# Patient Record
Sex: FEMALE | Race: WHITE | NOT HISPANIC OR LATINO | Employment: OTHER | URBAN - METROPOLITAN AREA
[De-identification: names, ages, dates, MRNs, and addresses within clinical notes are randomized per-mention and may not be internally consistent; named-entity substitution may affect disease eponyms.]

---

## 2020-06-04 ENCOUNTER — TRANSCRIBE ORDERS (OUTPATIENT)
Dept: ADMINISTRATIVE | Facility: HOSPITAL | Age: 75
End: 2020-06-04

## 2020-06-04 DIAGNOSIS — Z12.31 ENCOUNTER FOR SCREENING MAMMOGRAM FOR MALIGNANT NEOPLASM OF BREAST: Primary | ICD-10-CM

## 2021-04-07 RX ORDER — AMITRIPTYLINE HYDROCHLORIDE 10 MG/1
TABLET, FILM COATED ORAL
Qty: 90 TABLET | Refills: 1 | OUTPATIENT
Start: 2021-04-07

## 2021-05-11 ENCOUNTER — HOSPITAL ENCOUNTER (EMERGENCY)
Facility: HOSPITAL | Age: 76
Discharge: HOME | End: 2021-05-11
Attending: EMERGENCY MEDICINE | Admitting: EMERGENCY MEDICINE

## 2021-05-11 VITALS
RESPIRATION RATE: 18 BRPM | OXYGEN SATURATION: 97 % | WEIGHT: 145 LBS | DIASTOLIC BLOOD PRESSURE: 84 MMHG | TEMPERATURE: 98.5 F | HEART RATE: 73 BPM | BODY MASS INDEX: 24.16 KG/M2 | HEIGHT: 65 IN | SYSTOLIC BLOOD PRESSURE: 184 MMHG

## 2021-05-11 DIAGNOSIS — R52 GENERALIZED PAIN: ICD-10-CM

## 2021-05-11 DIAGNOSIS — L40.9 PSORIASIS: ICD-10-CM

## 2021-05-11 DIAGNOSIS — E11.49 OTHER DIABETIC NEUROLOGICAL COMPLICATION ASSOCIATED WITH TYPE 2 DIABETES MELLITUS (HCC): Primary | ICD-10-CM

## 2021-05-11 PROCEDURE — 99283 EMERGENCY DEPT VISIT LOW MDM: CPT

## 2021-05-11 RX ORDER — PANTOPRAZOLE SODIUM 40 MG/1
TABLET, DELAYED RELEASE ORAL
COMMUNITY
End: 2021-05-25 | Stop reason: SDUPTHER

## 2021-05-11 RX ORDER — GABAPENTIN 600 MG/1
600 TABLET ORAL 4 TIMES DAILY
COMMUNITY
Start: 2021-02-27

## 2021-05-11 RX ORDER — DIAZEPAM 5 MG/1
5 TABLET ORAL
COMMUNITY
Start: 2021-02-25 | End: 2021-05-24

## 2021-05-11 RX ORDER — AMITRIPTYLINE HYDROCHLORIDE 10 MG/1
TABLET, FILM COATED ORAL
COMMUNITY
End: 2021-05-24

## 2021-05-11 RX ORDER — CITALOPRAM 20 MG/1
TABLET ORAL
COMMUNITY
End: 2021-05-24

## 2021-05-11 RX ORDER — LITHIUM CARBONATE 150 MG/1
CAPSULE ORAL
COMMUNITY
End: 2022-05-05 | Stop reason: SDUPTHER

## 2021-05-11 RX ORDER — GABAPENTIN 100 MG/1
400 CAPSULE ORAL 3 TIMES DAILY
Status: DISCONTINUED | OUTPATIENT
Start: 2021-05-11 | End: 2021-05-12 | Stop reason: HOSPADM

## 2021-05-11 RX ORDER — GABAPENTIN 300 MG/1
300 CAPSULE ORAL 3 TIMES DAILY
Qty: 12 CAPSULE | Refills: 0 | Status: SHIPPED | OUTPATIENT
Start: 2021-05-11 | End: 2021-05-24 | Stop reason: DRUGHIGH

## 2021-05-11 RX ADMIN — GABAPENTIN 400 MG: 100 CAPSULE ORAL at 21:59

## 2021-05-24 ENCOUNTER — OFFICE VISIT (OUTPATIENT)
Dept: INTERNAL MEDICINE | Facility: CLINIC | Age: 76
End: 2021-05-24

## 2021-05-24 VITALS
TEMPERATURE: 98.4 F | DIASTOLIC BLOOD PRESSURE: 86 MMHG | BODY MASS INDEX: 26.66 KG/M2 | HEIGHT: 65 IN | SYSTOLIC BLOOD PRESSURE: 128 MMHG | WEIGHT: 160 LBS | OXYGEN SATURATION: 98 % | HEART RATE: 65 BPM

## 2021-05-24 DIAGNOSIS — R73.03 PREDIABETES: ICD-10-CM

## 2021-05-24 DIAGNOSIS — Z79.899 LONG-TERM CURRENT USE OF LITHIUM: ICD-10-CM

## 2021-05-24 DIAGNOSIS — E78.2 MIXED HYPERLIPIDEMIA: ICD-10-CM

## 2021-05-24 DIAGNOSIS — R03.0 BORDERLINE HYPERTENSION: ICD-10-CM

## 2021-05-24 DIAGNOSIS — F17.210 CIGARETTE SMOKER: ICD-10-CM

## 2021-05-24 DIAGNOSIS — K21.9 GASTROESOPHAGEAL REFLUX DISEASE, UNSPECIFIED WHETHER ESOPHAGITIS PRESENT: ICD-10-CM

## 2021-05-24 DIAGNOSIS — F31.32 BIPOLAR AFFECTIVE DISORDER, CURRENTLY DEPRESSED, MODERATE (HCC): Primary | ICD-10-CM

## 2021-05-24 DIAGNOSIS — Z11.59 NEED FOR HEPATITIS C SCREENING TEST: ICD-10-CM

## 2021-05-24 PROBLEM — L40.9 PSORIASIS: Status: ACTIVE | Noted: 2020-09-23

## 2021-05-24 PROBLEM — F31.9 BIPOLAR DISORDER (HCC): Status: ACTIVE | Noted: 2020-06-01

## 2021-05-24 LAB — HBA1C MFR BLD: 5.7 %

## 2021-05-24 PROCEDURE — 83036 HEMOGLOBIN GLYCOSYLATED A1C: CPT | Performed by: STUDENT IN AN ORGANIZED HEALTH CARE EDUCATION/TRAINING PROGRAM

## 2021-05-24 PROCEDURE — 99205 OFFICE O/P NEW HI 60 MIN: CPT | Performed by: STUDENT IN AN ORGANIZED HEALTH CARE EDUCATION/TRAINING PROGRAM

## 2021-05-24 RX ORDER — QUETIAPINE FUMARATE 50 MG/1
TABLET, FILM COATED ORAL
Qty: 75 TABLET | Refills: 0 | Status: SHIPPED | OUTPATIENT
Start: 2021-05-24 | End: 2021-05-25 | Stop reason: ALTCHOICE

## 2021-05-24 NOTE — PROGRESS NOTES
Chief Complaint  Cierra Alvarez is a 75 y.o. female presenting for Elevated Blood Pressure (Establish care), Depression, and sleep issues.     From [facility]. Lives by herself, single, no children. Niece in [place] is closest family member - symptoms of Huntingtons.  Patient has 2 siblings with David's.  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 Lives by herself, single, no children. Niece in [place] is closest family member - symptoms of Huntingtons.  Patient has 2 siblings with Pittsford's.  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    Patient has a past medical history of bipolar disorder, depression, GERD, osteoarthritis, prediabetes (denies T2DM or A1c over 6.4), psoriasis, vitamin D deficiency and elevated blood pressures.    History of Present Illness  Patient is here to establish care.  He has a history of depression and in the past she has been on citalopram, Prozac, amitriptyline.  She did have manic episodes in the past.  She is no longer follow-up with psychiatry, but is looking into options.  Recently she has been feeling more down and depressed, decreased sleep.  She is requesting to restart amitriptyline, but it sounds like she also had reaction to this in the past, possibly low blood pressure.  She has never been on Seroquel.  She also used to be on Xanax and diazepam, but has not used for months and does not want to stay on this medication.  She uses lithium 150 mg 3 times daily.    She did not have hypertension in the past she says, however she has not told monitoring her blood pressures at home, and typically range in the 150s over 80s, however this been occasional blood pressure around 170 systolic and up to around 90 diastolic.  However she also says that sometimes it measures very low, and this morning it was around 90/60.    She also has a history of neuropathy, and follows with neurology.  She is on gabapentin prescribed by  "her neurologist.    There is outside records suggesting she has a history of T2DM with neuropathy.  Patient adamantly denies ever being diagnosed with T2DM, she says her A1c never was over 6.4.  She states she has a history of prediabetes.  With this information I have placed prediabetes as her diagnosis.    She also has a history of GERD, she uses Protonix.    Patient smokes cigarettes, now about 1 pack a day, not long ago she used to smoke 3 packs a day.  She is trying to cut down and would like to quit smoking, but she is not ready to quit entirely yet.    Patient is very physically active, walks a lot downtown where she lives.  Goes to the library regularly.    The following portions of the patient's history were reviewed and updated as appropriate: allergies, current medications, past family history, past medical history, past social history, past surgical history and problem list.    PHQ-9 Depression Screening  Little interest or pleasure in doing things? 3   Feeling down, depressed, or hopeless? 3   Trouble falling or staying asleep, or sleeping too much? 3   Feeling tired or having little energy? 0   Poor appetite or overeating? 3   Feeling bad about yourself - or that you are a failure or have let yourself or your family down? 0   Trouble concentrating on things, such as reading the newspaper or watching television? 3   Moving or speaking so slowly that other people could have noticed? Or the opposite - being so fidgety or restless that you have been moving around a lot more than usual? 1   Thoughts that you would be better off dead, or of hurting yourself in some way? 0   PHQ-9 Total Score 16   If you checked off any problems, how difficult have these problems made it for you to do your work, take care of things at home, or get along with other people? Extremely dIfficult         Objective  /86   Pulse 65   Temp 98.4 °F (36.9 °C) (Temporal)   Ht 165.1 cm (65\")   Wt 72.6 kg (160 lb)   SpO2 98%   " BMI 26.63 kg/m²     Physical Exam  Vitals reviewed.   Constitutional:       Appearance: Normal appearance.   HENT:      Head: Normocephalic and atraumatic.      Nose: Nose normal. No congestion.      Mouth/Throat:      Mouth: Mucous membranes are moist.   Eyes:      Extraocular Movements: Extraocular movements intact.      Conjunctiva/sclera: Conjunctivae normal.   Cardiovascular:      Rate and Rhythm: Normal rate and regular rhythm.      Heart sounds: Normal heart sounds. No murmur heard.     Pulmonary:      Effort: Pulmonary effort is normal.      Breath sounds: Normal breath sounds.   Abdominal:      General: There is no distension.      Palpations: Abdomen is soft. There is no mass.      Tenderness: There is no abdominal tenderness.   Musculoskeletal:      Cervical back: Neck supple.      Right lower leg: Edema present.      Left lower leg: Edema present.      Comments: Mild bilateral lower leg edema.   Skin:     General: Skin is warm and dry.   Neurological:      Mental Status: She is alert and oriented to person, place, and time. Mental status is at baseline.   Psychiatric:         Mood and Affect: Mood normal.         Behavior: Behavior normal.         Thought Content: Thought content normal.         Judgment: Judgment normal.         Assessment/Plan   1. Bipolar affective disorder, currently depressed, moderate (CMS/HCC)  2. Long-term current use of lithium  Patient currently with moderate depression.  No SI whatsoever, no HI.  Patient did not have any suicide attempts in the past.  Despite that patient has used SSRIs in the past, I have explained to patient my concern of starting her on SSRI with the potential risk of hypomania/su, which she has experienced in the past.  Also amitriptyline is usually not recommended in elderly due to anticholinergic side effects, risk for low blood pressure.  Patient has not tried quetiapine in the past and is willing to try it.  We will do the gradual increase and  patient will return for follow-up with me in 4 weeks.  We will do labs as ordered due to her lithium use.  Patient will continue on current dose.  - QUEtiapine (SEROquel) 50 MG tablet; Take 1 tablet by mouth Every Night for 5 days, THEN 2 tablets Every Night for 5 days, THEN 3 tablets Every Night for 20 days.  Dispense: 75 tablet; Refill: 0  - Comprehensive Metabolic Panel; Future  - TSH Rfx On Abnormal To Free T4; Future    3. Prediabetes  Hemoglobin A1C   Date Value Ref Range Status   05/24/2021 5.7 % Final   Patient meets criteria for prediabetes.  For now I am assuming her history is accurate, and would not place a diagnosis of diabetes for now.  - POC Glycosylated Hemoglobin (Hb A1C)    4. Gastroesophageal reflux disease, unspecified whether esophagitis present  Stable.  No current symptoms.  Patient wants to continue on her tonics.    5. Borderline hypertension  BP Readings from Last 3 Encounters:   05/24/21 128/86   05/11/21 (!) 184/84   Patient certainly has elevated blood pressures at home, however there is a wide range, and blood pressure initially today is mildly elevated, but repeat is normal.  I recommended she bring in her blood pressure monitor on her next visit to check it against our measurement.  If she continues to have elevated blood pressures I would recommend starting her on medications.    6. Mixed hyperlipidemia  We will check current levels.  Not on statin.  - Lipid Panel; Future    7. Cigarette smoker  Cierra Alvarez  reports that she has been smoking cigarettes. She has a 59.00 pack-year smoking history. She has never used smokeless tobacco.. I have educated her on the risk of diseases from using tobacco products such as cancer, COPD and heart disease.     I advised her to quit and she is not willing to quit at this time, but is precontemplative.    I spent 3  minutes counseling the patient.    8. Need for hepatitis C screening test  We will screen per recommendations.  - Hepatitis C  Antibody; Future    Total time spent on chart review, charting and face-to-face with patient 70 minutes.    Patient has been erroneously marked as diabetic. Based on the available clinical information, she does not have diabetes and should therefore be excluded from diabetic health maintenance and quality measures for the remainder of the reporting period.    I have recommended colonoscopy for patient, she will think about it until next visit.    Return in about 4 weeks (around 6/21/2021) for Medicare Wellness.    Future Appointments       Provider Department Center    6/21/2021 8:30 AM Pietro Barber MD [facility] INTERNAL MEDICINE [facility]            Pietro Barber MD  Family Medicine  05/24/2021    Note: Speech recognition transcription software may have been used to create portions of this document.  An attempt at proofreading has been made but errors in transcription could still be present.

## 2021-05-25 ENCOUNTER — TELEPHONE (OUTPATIENT)
Dept: INTERNAL MEDICINE | Facility: CLINIC | Age: 76
End: 2021-05-25

## 2021-05-25 DIAGNOSIS — F33.8 OTHER RECURRENT DEPRESSIVE DISORDERS (HCC): ICD-10-CM

## 2021-05-25 DIAGNOSIS — K21.9 GASTROESOPHAGEAL REFLUX DISEASE, UNSPECIFIED WHETHER ESOPHAGITIS PRESENT: Primary | ICD-10-CM

## 2021-05-25 RX ORDER — AMITRIPTYLINE HYDROCHLORIDE 10 MG/1
TABLET, FILM COATED ORAL
Qty: 90 TABLET | Refills: 1 | OUTPATIENT
Start: 2021-05-25

## 2021-05-25 RX ORDER — FLUOXETINE HYDROCHLORIDE 20 MG/1
20 CAPSULE ORAL DAILY
Qty: 30 CAPSULE | Refills: 1 | Status: SHIPPED | OUTPATIENT
Start: 2021-05-25 | End: 2021-06-16 | Stop reason: SDUPTHER

## 2021-05-25 RX ORDER — AMITRIPTYLINE HYDROCHLORIDE 10 MG/1
10 TABLET, FILM COATED ORAL NIGHTLY
Qty: 30 TABLET | Refills: 1 | Status: SHIPPED | OUTPATIENT
Start: 2021-05-25 | End: 2021-06-16 | Stop reason: SDUPTHER

## 2021-05-25 RX ORDER — PANTOPRAZOLE SODIUM 40 MG/1
40 TABLET, DELAYED RELEASE ORAL DAILY
Qty: 30 TABLET | Refills: 2 | Status: SHIPPED | OUTPATIENT
Start: 2021-05-25 | End: 2021-09-07

## 2021-05-25 NOTE — TELEPHONE ENCOUNTER
Caller: Cierra Alvarez    Relationship: Self    Best call back number: [phone]    What medication are you requesting: PROZAC 20MG AND ELAVIL 20MG    What are your current symptoms: BIPOLAR DEPRESSIVE EPISODE    How long have you been experiencing symptoms: 2 MONTHS    Have you had these symptoms before:    [x] Yes  [] No    Have you been treated for these symptoms before:   [x] Yes  [] No    If a prescription is needed, what is your preferred pharmacy and phone number: [facility] [id number] [place] [address] [phone] [facility] [phone]      Additional notes: THE PATIENT STATED SHE DOES NOT THINK SHE CAN TAKE QUEtiapine (SEROquel) 50 MG tablet DUE TO HER DIABETES. THE PATIENT WOULD LIKE PROZAC 20MG AND ELAVIL 20 MG FOR SLEEP INSTEAD TO HELP HER THROUGH THIS EPISODE. SHE WOULD A CALL BACK ASA.

## 2021-05-25 NOTE — TELEPHONE ENCOUNTER
See note from patient.  I called her and talk to her over the phone.  Patient as well as potential side effects Seroquel and with better go back to Prozac and Elavil that she used to be on.  I had a long discussion with her that these are not typically recommended in patients with bipolar disorder, take care of the risk of elevated mood, hypomania or su.  Also Elavil can cause cough for blood pressure and anticholinergic side effects.  Patient is still adamant that she wants to get back on these medications as she has tolerated it in the past.    We will discontinue Seroquel for now and start on these medications per patient preference, despite my advice.  She will follow up with me as planned.    1. Gastroesophageal reflux disease, unspecified whether esophagitis present  - pantoprazole (PROTONIX) 40 MG EC tablet; Take 1 tablet by mouth Daily.  Dispense: 30 tablet; Refill: 2    2. Other recurrent depressive disorders (CMS/HCC)  - FLUoxetine (PROzac) 20 MG capsule; Take 1 capsule by mouth Daily.  Dispense: 30 capsule; Refill: 1  - amitriptyline (ELAVIL) 10 MG tablet; Take 1 tablet by mouth Every Night.  Dispense: 30 tablet; Refill: 1    Future Appointments       Provider Department Center    6/21/2021 8:30 AM Pietro Barber MD [facility] INTERNAL MEDICINE [facility]        Pietro Barber MD

## 2021-06-01 ENCOUNTER — TELEPHONE (OUTPATIENT)
Dept: INTERNAL MEDICINE | Facility: CLINIC | Age: 76
End: 2021-06-01

## 2021-06-16 DIAGNOSIS — F33.8 OTHER RECURRENT DEPRESSIVE DISORDERS (HCC): ICD-10-CM

## 2021-06-16 RX ORDER — AMITRIPTYLINE HYDROCHLORIDE 10 MG/1
10 TABLET, FILM COATED ORAL NIGHTLY
Qty: 90 TABLET | Refills: 1 | Status: SHIPPED | OUTPATIENT
Start: 2021-06-16 | End: 2021-10-13 | Stop reason: SDUPTHER

## 2021-06-16 RX ORDER — FLUOXETINE HYDROCHLORIDE 20 MG/1
20 CAPSULE ORAL DAILY
Qty: 90 CAPSULE | Refills: 1 | Status: SHIPPED | OUTPATIENT
Start: 2021-06-16 | End: 2021-10-13 | Stop reason: SDUPTHER

## 2021-06-17 ENCOUNTER — LAB (OUTPATIENT)
Dept: LAB | Facility: HOSPITAL | Age: 76
End: 2021-06-17

## 2021-06-17 DIAGNOSIS — F31.32 BIPOLAR AFFECTIVE DISORDER, CURRENTLY DEPRESSED, MODERATE (HCC): ICD-10-CM

## 2021-06-17 DIAGNOSIS — E78.2 MIXED HYPERLIPIDEMIA: ICD-10-CM

## 2021-06-17 DIAGNOSIS — Z11.59 NEED FOR HEPATITIS C SCREENING TEST: ICD-10-CM

## 2021-06-17 LAB
ALBUMIN SERPL-MCNC: 4.1 G/DL (ref 3.5–5.2)
ALBUMIN/GLOB SERPL: 1.9 G/DL
ALP SERPL-CCNC: 96 U/L (ref 39–117)
ALT SERPL W P-5'-P-CCNC: 14 U/L (ref 1–33)
ANION GAP SERPL CALCULATED.3IONS-SCNC: 7.5 MMOL/L (ref 5–15)
AST SERPL-CCNC: 14 U/L (ref 1–32)
BILIRUB SERPL-MCNC: 0.2 MG/DL (ref 0–1.2)
BUN SERPL-MCNC: 10 MG/DL (ref 8–23)
BUN/CREAT SERPL: 14.9 (ref 7–25)
CALCIUM SPEC-SCNC: 9 MG/DL (ref 8.6–10.5)
CHLORIDE SERPL-SCNC: 104 MMOL/L (ref 98–107)
CHOLEST SERPL-MCNC: 166 MG/DL (ref 0–200)
CO2 SERPL-SCNC: 28.5 MMOL/L (ref 22–29)
CREAT SERPL-MCNC: 0.67 MG/DL (ref 0.57–1)
GFR SERPL CREATININE-BSD FRML MDRD: 86 ML/MIN/1.73
GLOBULIN UR ELPH-MCNC: 2.2 GM/DL
GLUCOSE SERPL-MCNC: 97 MG/DL (ref 65–99)
HCV AB SER DONR QL: NORMAL
HDLC SERPL-MCNC: 70 MG/DL (ref 40–60)
LDLC SERPL CALC-MCNC: 75 MG/DL (ref 0–100)
LDLC/HDLC SERPL: 1.03 {RATIO}
POTASSIUM SERPL-SCNC: 4.2 MMOL/L (ref 3.5–5.2)
PROT SERPL-MCNC: 6.3 G/DL (ref 6–8.5)
SODIUM SERPL-SCNC: 140 MMOL/L (ref 136–145)
TRIGL SERPL-MCNC: 118 MG/DL (ref 0–150)
TSH SERPL DL<=0.05 MIU/L-ACNC: 1.55 UIU/ML (ref 0.27–4.2)
VLDLC SERPL-MCNC: 21 MG/DL (ref 5–40)

## 2021-06-17 PROCEDURE — 86803 HEPATITIS C AB TEST: CPT

## 2021-06-17 PROCEDURE — 80061 LIPID PANEL: CPT

## 2021-06-17 PROCEDURE — 80053 COMPREHEN METABOLIC PANEL: CPT

## 2021-06-17 PROCEDURE — 84443 ASSAY THYROID STIM HORMONE: CPT

## 2021-06-18 ENCOUNTER — TELEPHONE (OUTPATIENT)
Dept: INTERNAL MEDICINE | Facility: CLINIC | Age: 76
End: 2021-06-18

## 2021-06-18 NOTE — TELEPHONE ENCOUNTER
----- Message from Pietro Barber MD sent at 6/18/2021  8:16 AM EDT -----  Blood tests came back showing normal thyroid, normal blood sugar, kidney, electrolytes and liver tests.  Cholesterol levels look good.There is no sign of liver infection with hepatitis C virus.

## 2021-06-18 NOTE — TELEPHONE ENCOUNTER
"Hub to read: \"Blood tests came back showing normal thyroid, normal blood sugar, kidney, electrolytes and liver tests.  Cholesterol levels look good.There is no sign of liver infection with hepatitis C virus.\"     "

## 2021-06-21 ENCOUNTER — OFFICE VISIT (OUTPATIENT)
Dept: INTERNAL MEDICINE | Facility: CLINIC | Age: 76
End: 2021-06-21

## 2021-06-21 VITALS
WEIGHT: 154 LBS | HEART RATE: 59 BPM | DIASTOLIC BLOOD PRESSURE: 84 MMHG | OXYGEN SATURATION: 97 % | BODY MASS INDEX: 25.66 KG/M2 | TEMPERATURE: 97.5 F | SYSTOLIC BLOOD PRESSURE: 136 MMHG | HEIGHT: 65 IN

## 2021-06-21 DIAGNOSIS — G95.0 SYRINGOMYELIA (HCC): ICD-10-CM

## 2021-06-21 DIAGNOSIS — Z91.81 AT MODERATE RISK FOR FALL: ICD-10-CM

## 2021-06-21 DIAGNOSIS — F31.75 BIPOLAR DISORDER, IN PARTIAL REMISSION, MOST RECENT EPISODE DEPRESSED (HCC): ICD-10-CM

## 2021-06-21 DIAGNOSIS — F17.210 CIGARETTE SMOKER: ICD-10-CM

## 2021-06-21 DIAGNOSIS — K21.9 GASTROESOPHAGEAL REFLUX DISEASE, UNSPECIFIED WHETHER ESOPHAGITIS PRESENT: ICD-10-CM

## 2021-06-21 DIAGNOSIS — Z00.00 MEDICARE ANNUAL WELLNESS VISIT, SUBSEQUENT: Primary | ICD-10-CM

## 2021-06-21 DIAGNOSIS — Z12.11 SCREEN FOR COLON CANCER: ICD-10-CM

## 2021-06-21 DIAGNOSIS — Z71.89 ADVANCE CARE PLANNING: ICD-10-CM

## 2021-06-21 DIAGNOSIS — R03.0 BORDERLINE HYPERTENSION: ICD-10-CM

## 2021-06-21 PROCEDURE — G0439 PPPS, SUBSEQ VISIT: HCPCS | Performed by: STUDENT IN AN ORGANIZED HEALTH CARE EDUCATION/TRAINING PROGRAM

## 2021-06-21 NOTE — PROGRESS NOTES
The ABCs of the Annual Wellness Visit  Subsequent Medicare Wellness Visit    Chief Complaint   Patient presents with   • Annual Exam     AWV fasting        Subjective   History of Present Illness:  Cierra Alvarez is a 75 y.o. female who presents for a Subsequent Medicare Wellness Visit.    Patient has a past medical history of bipolar disorder, depression, GERD, osteoarthritis, syringomyelia (per outside records, asymptomatic currently), prediabetes (denies T2DM or A1c over 6.4), psoriasis, vitamin D deficiency and elevated blood pressures.      HEALTH RISK ASSESSMENT    Recent Hospitalizations:  No hospitalization(s) within the last year.    Current Medical Providers:  Patient Care Team:  Pietro Barber MD as PCP - General (Family Medicine)    Smoking Status:  Social History     Tobacco Use   Smoking Status Current Every Day Smoker   • Packs/day: 2.00   • Years: 59.00   • Pack years: 118.00   • Types: Cigarettes   Smokeless Tobacco Never Used       Alcohol Consumption:  Social History     Substance and Sexual Activity   Alcohol Use Yes    Comment: 2-3 times a year       Depression Screen:   PHQ-2/PHQ-9 Depression Screening 6/21/2021   Little interest or pleasure in doing things 1   Feeling down, depressed, or hopeless 0   Trouble falling or staying asleep, or sleeping too much -   Feeling tired or having little energy -   Poor appetite or overeating -   Feeling bad about yourself - or that you are a failure or have let yourself or your family down -   Trouble concentrating on things, such as reading the newspaper or watching television -   Moving or speaking so slowly that other people could have noticed. Or the opposite - being so fidgety or restless that you have been moving around a lot more than usual -   Thoughts that you would be better off dead, or of hurting yourself in some way -   Total Score 1   If you checked off any problems, how difficult have these problems made it for you to do your work, take  care of things at home, or get along with other people? -       Fall Risk Screen:  TUYET Fall Risk Assessment was completed, and patient is at LOW risk for falls.Assessment completed on:6/21/2021    Health Habits and Functional and Cognitive Screening:  Functional & Cognitive Status 6/21/2021   Do you have difficulty preparing food and eating? No   Do you have difficulty bathing yourself, getting dressed or grooming yourself? No   Do you have difficulty using the toilet? No   Do you have difficulty moving around from place to place? No   Do you have trouble with steps or getting out of a bed or a chair? No   Current Diet Well Balanced Diet   Dental Exam Not up to date   Eye Exam Up to date   Exercise (times per week) 3 times per week   Current Exercises Include Walking   Do you need help using the phone?  No   Are you deaf or do you have serious difficulty hearing?  No   Do you need help with transportation? No   Do you need help shopping? No   Do you need help preparing meals?  No   Do you need help with housework?  No   Do you need help with laundry? No   Do you need help taking your medications? No   Do you need help managing money? No   Do you ever drive or ride in a car without wearing a seat belt? No   Have you felt unusual stress, anger or loneliness in the last month? No   Who do you live with? Alone   If you need help, do you have trouble finding someone available to you? No   Have you been bothered in the last four weeks by sexual problems? No   Do you have difficulty concentrating, remembering or making decisions? No         Does the patient have evidence of cognitive impairment? No    Asprin use counseling:Does not need ASA (and currently is not on it)    Age-appropriate Screening Schedule:  Refer to the list below for future screening recommendations based on patient's age, sex and/or medical conditions. Orders for these recommended tests are listed in the plan section. The patient has been provided  with a written plan.    Health Maintenance   Topic Date Due   • DXA SCAN  Never done   • ZOSTER VACCINE (2 of 3) 06/06/2012   • INFLUENZA VACCINE  08/01/2021   • TDAP/TD VACCINES (2 - Td or Tdap) 03/29/2022   • LIPID PANEL  06/17/2022          The following portions of the patient's history were reviewed and updated as appropriate: allergies, current medications, past family history, past medical history, past social history, past surgical history and problem list.    Outpatient Medications Prior to Visit   Medication Sig Dispense Refill   • amitriptyline (ELAVIL) 10 MG tablet Take 1 tablet by mouth Every Night. 90 tablet 1   • Cholecalciferol (vitamin D3) 125 MCG (5000 UT) tablet tablet Take 5,000 Units by mouth Daily.     • FLUoxetine (PROzac) 20 MG capsule Take 1 capsule by mouth Daily. 90 capsule 1   • gabapentin (NEURONTIN) 600 MG tablet Takes about 6 tablets daily     • lithium carbonate 150 MG capsule Takes 3 capsules once a day     • pantoprazole (PROTONIX) 40 MG EC tablet Take 1 tablet by mouth Daily. 30 tablet 2     No facility-administered medications prior to visit.       Patient Active Problem List   Diagnosis   • Prediabetes   • Bipolar disorder (CMS/HCC)   • Gastroesophageal reflux disease   • Psoriasis   • Cigarette smoker   • Borderline hypertension   • Syringomyelia (CMS/HCC)   • Idiopathic osteoarthritis       Advanced Care Planning:  ACP discussion was held with the patient during this visit. Patient does not have an advance directive, information provided.    Review of Systems    Compared to one year ago, the patient feels her physical health is worse.  Compared to one year ago, the patient feels her mental health is worse.    Reviewed chart for potential of high risk medication in the elderly: yes  Reviewed chart for potential of harmful drug interactions in the elderly:yes    Objective         Vitals:    06/21/21 0828 06/21/21 0838   BP: 148/74 136/84   BP Location: Left arm Left arm   Patient  "Position: Sitting Sitting   Cuff Size: Adult Adult   Pulse: 59    Temp: 97.5 °F (36.4 °C)    TempSrc: Temporal    SpO2: 97%    Weight: 69.9 kg (154 lb)    Height: 165.1 cm (65\")    PainSc: 0-No pain        Body mass index is 25.63 kg/m².  Discussed the patient's BMI with her. The BMI is above average; BMI management plan is completed.    Physical Exam  Vitals reviewed.   Constitutional:       Appearance: Normal appearance.   HENT:      Head: Normocephalic and atraumatic.      Nose: No congestion.   Eyes:      Extraocular Movements: Extraocular movements intact.      Conjunctiva/sclera: Conjunctivae normal.   Cardiovascular:      Rate and Rhythm: Normal rate and regular rhythm.      Heart sounds: Normal heart sounds. No murmur heard.     Pulmonary:      Effort: Pulmonary effort is normal.      Breath sounds: Normal breath sounds.   Abdominal:      General: There is no distension.      Palpations: Abdomen is soft. There is no mass.      Tenderness: There is no abdominal tenderness.   Musculoskeletal:      Cervical back: Neck supple.      Right lower leg: No edema.      Left lower leg: No edema.   Skin:     General: Skin is warm and dry.   Neurological:      Mental Status: She is alert and oriented to person, place, and time. Mental status is at baseline.      Motor: No weakness.      Gait: Gait normal.   Psychiatric:         Behavior: Behavior normal.         Thought Content: Thought content normal.         Lab Results   Component Value Date    TRIG 118 06/17/2021    HDL 70 (H) 06/17/2021    LDL 75 06/17/2021    VLDL 21 06/17/2021    HGBA1C 5.7 05/24/2021        Assessment/Plan   Medicare Risks and Personalized Health Plan  CMS Preventative Services Quick Reference  Advance Directive Discussion  Depression/Dysphoria  Fall Risk  Lung Cancer Risk    The above risks/problems have been discussed with the patient.  Pertinent information has been shared with the patient in the After Visit Summary.  Follow up plans and " orders are seen below in the Assessment/Plan Section.    Diagnoses and all orders for this visit:    1. Medicare annual wellness visit, subsequent (Primary)    2. Screen for colon cancer  -     Amb referral for Screening Colonoscopy    3. Syringomyelia (CMS/[facility])  Patient not aware of this diagnosis, states that she did have numbness of her hands in the past, but no current symptoms.    4. At moderate risk for fall  Patient is at risk for falls, has not been exercising recently.  He used to go to the [facility] 3 days a week before the pandemic, and she plans to start again coming up.  I have offered her referral to physical therapy, she declines for now.    5. Borderline hypertension  BP Readings from Last 3 Encounters:   06/21/21 136/84   05/24/21 128/86   05/11/21 (!) 184/84   Blood pressures currently at goal, her initial blood pressure mildly elevated today.  She was on medications in the past.  For now she can continue without medications.    6. Gastroesophageal reflux disease, unspecified whether esophagitis present  Patient is on pantoprazole 40 mg daily, he has tried to decrease the dose in the past but gets recurrence of her reflux.  I have made her aware of long-term risks of use, however she still like to continue on current dose due to worsening GERD on lower doses.    7. Bipolar disorder, in partial remission, most recent episode depressed (CMS/[facility])  Patient states she is better after starting Prozac again, her sleep has also improved.  She is not using amitriptyline 10 mg, but we have had a long discussion about this on her last visit and she is aware that it has risks associated, including increased risk of falls and low blood pressure.  She would like to be on medication for a few more weeks and then stop taking it.    8. Cigarette smoker  Patient continues to smoke cigarettes.  Counseled on cessation, but patient still is not ready to quit.  I have also discussed screening for lung cancer with LDCT.   Patient says she is well aware of the risks of smoking and does not want to proceed with any screening tests for lung cancer at this time.    9. Advance care planning  -     Ambulatory Referral to Advance Care Planning  Patient does not have advanced directives or living will.  We have had a long discussion about it she would like to engage in planning.    Follow Up:  Return in 3 months (on 9/21/2021) for Recheck.     An After Visit Summary and PPPS were given to the patient.     Future Appointments       Provider Department Center    9/21/2021 8:30 AM Pietro Barber MD [facility] INTERNAL MEDICINE [facility]          Pietro Barber MD

## 2021-06-21 NOTE — PATIENT INSTRUCTIONS

## 2021-09-05 DIAGNOSIS — K21.9 GASTROESOPHAGEAL REFLUX DISEASE, UNSPECIFIED WHETHER ESOPHAGITIS PRESENT: ICD-10-CM

## 2021-09-07 RX ORDER — PANTOPRAZOLE SODIUM 40 MG/1
TABLET, DELAYED RELEASE ORAL
Qty: 90 TABLET | Refills: 1 | Status: SHIPPED | OUTPATIENT
Start: 2021-09-07 | End: 2021-12-03 | Stop reason: SDUPTHER

## 2021-10-13 ENCOUNTER — OFFICE VISIT (OUTPATIENT)
Dept: INTERNAL MEDICINE | Facility: CLINIC | Age: 76
End: 2021-10-13

## 2021-10-13 VITALS
HEART RATE: 68 BPM | DIASTOLIC BLOOD PRESSURE: 70 MMHG | BODY MASS INDEX: 24.66 KG/M2 | TEMPERATURE: 98.6 F | WEIGHT: 148 LBS | HEIGHT: 65 IN | SYSTOLIC BLOOD PRESSURE: 132 MMHG

## 2021-10-13 DIAGNOSIS — F33.8 OTHER RECURRENT DEPRESSIVE DISORDERS (HCC): ICD-10-CM

## 2021-10-13 DIAGNOSIS — E11.621 DIABETIC ULCER OF RIGHT HEEL ASSOCIATED WITH TYPE 2 DIABETES MELLITUS, LIMITED TO BREAKDOWN OF SKIN (HCC): Primary | ICD-10-CM

## 2021-10-13 DIAGNOSIS — R73.03 PREDIABETES: ICD-10-CM

## 2021-10-13 DIAGNOSIS — L97.411 DIABETIC ULCER OF RIGHT HEEL ASSOCIATED WITH TYPE 2 DIABETES MELLITUS, LIMITED TO BREAKDOWN OF SKIN (HCC): Primary | ICD-10-CM

## 2021-10-13 DIAGNOSIS — F31.75 BIPOLAR DISORDER, IN PARTIAL REMISSION, MOST RECENT EPISODE DEPRESSED (HCC): ICD-10-CM

## 2021-10-13 LAB
EXPIRATION DATE: ABNORMAL
HBA1C MFR BLD: 6.2 % (ref 4.8–5.6)
Lab: ABNORMAL

## 2021-10-13 PROCEDURE — 99214 OFFICE O/P EST MOD 30 MIN: CPT | Performed by: NURSE PRACTITIONER

## 2021-10-13 PROCEDURE — 83036 HEMOGLOBIN GLYCOSYLATED A1C: CPT | Performed by: NURSE PRACTITIONER

## 2021-10-13 PROCEDURE — 3044F HG A1C LEVEL LT 7.0%: CPT | Performed by: NURSE PRACTITIONER

## 2021-10-13 RX ORDER — AMITRIPTYLINE HYDROCHLORIDE 10 MG/1
TABLET, FILM COATED ORAL
Qty: 90 TABLET | Refills: 1 | OUTPATIENT
Start: 2021-10-13

## 2021-10-13 RX ORDER — AMITRIPTYLINE HYDROCHLORIDE 10 MG/1
10 TABLET, FILM COATED ORAL NIGHTLY
Qty: 30 TABLET | Refills: 0 | Status: SHIPPED | OUTPATIENT
Start: 2021-10-13 | End: 2021-11-05

## 2021-10-13 RX ORDER — FLUOXETINE HYDROCHLORIDE 40 MG/1
40 CAPSULE ORAL DAILY
Qty: 30 CAPSULE | Refills: 0 | Status: SHIPPED | OUTPATIENT
Start: 2021-10-13 | End: 2021-11-05

## 2021-10-13 NOTE — PROGRESS NOTES
Cierra Alvarez  1945  [id number]  Patient Care Team:  Pietro Barber MD as PCP - General (Family Medicine)    Cierra Alvarez is a pleasant 75 y.o. female who presents for evaluation of Foot Problem (wound check on back of right heel ) and Prediabetes    Chief Complaint   Patient presents with   • Foot Problem     wound check on back of right heel    • Prediabetes       HPI:   Right heel ulcer vs blister. she noticed mild redness and loss of superficial skin 4-5 days ago.  Was wearing old tennis shoes shoes x 10 days and last night realized there was a wire in that shoe. Stayed off her foot 4-5 days.  She has diabetic neuropathy     DM:  Never on meds, blood sugars improved with wt loss, highest weight 202 ~4 yrs ago.  A1C today 6.2    Additionally she has been out of elavil since end Sept, she continued to take the 30mg for sleep although PCP advised to take the 10mg dose. She would like a refill. She also increased her paroxetine to 40mg which is helping with depression, she would also like this refilled.  She is on gabapentin for peripheral neuropathy, this is prescribed by Dr. Pastrana  Past Medical History:   Diagnosis Date   • Depression    • Heartburn    • Hypertension      Past Surgical History:   Procedure Laterality Date   • CHOLECYSTECTOMY     • FRACTURE SURGERY      TRi- Malleolar     Family History   Problem Relation Age of Onset   • Cancer Mother    • Hypertension Mother    • Stroke Mother    • Cancer Sister      Social History     Tobacco Use   Smoking Status Current Every Day Smoker   • Packs/day: 2.00   • Years: 59.00   • Pack years: 118.00   • Types: Cigarettes   Smokeless Tobacco Never Used     Allergies   Allergen Reactions   • Contrast Dye GI Intolerance       Current Outpatient Medications:   •  amitriptyline (ELAVIL) 10 MG tablet, Take 1 tablet by mouth Every Night., Disp: 30 tablet, Rfl: 0  •  Cholecalciferol (vitamin D3) 125 MCG (5000 UT) tablet tablet, Take 5,000 Units by mouth Daily.,  "Disp: , Rfl:   •  FLUoxetine (PROzac) 40 MG capsule, Take 1 capsule by mouth Daily., Disp: 30 capsule, Rfl: 0  •  gabapentin (NEURONTIN) 600 MG tablet, Take 600 mg by mouth 4 (Four) Times a Day., Disp: , Rfl:   •  lithium carbonate 150 MG capsule, Takes 3 capsules once a day, Disp: , Rfl:   •  pantoprazole (PROTONIX) 40 MG EC tablet, TAKE 1 TABLET BY MOUTH EVERY DAY, Disp: 90 tablet, Rfl: 1    Review of Systems  /70 (BP Location: Left arm, Patient Position: Sitting, Cuff Size: Adult)   Pulse 68   Temp 98.6 °F (37 °C) (Temporal)   Ht 165.1 cm (65\")   Wt 67.1 kg (148 lb)   BMI 24.63 kg/m²     Physical Exam  Vitals reviewed.   Constitutional:       Appearance: She is well-developed.   Pulmonary:      Effort: Pulmonary effort is normal.   Feet:      Comments: Right heel: 2-3mm circular ulceration, superficial layer, tender to touch, no drainage or surrounding erythema    Skin:     General: Skin is warm and dry.   Neurological:      Mental Status: She is alert.   Psychiatric:         Behavior: Behavior normal.         Procedures    Results Review:  None    PHQ-9 Total Score:      Assessment/Plan:  Diagnoses and all orders for this visit:    1. Diabetic ulcer of right heel associated with type 2 diabetes mellitus, limited to breakdown of skin (HCC) (Primary)  -     Ambulatory Referral to Wound Clinic    2. Bipolar disorder, in partial remission, most recent episode depressed (HCC)  Comments:  continue lithium, ok to continue paroxetine 40mg, will refill. she will return to discuss Elavil dosing with PCP  Orders:  -     FLUoxetine (PROzac) 40 MG capsule; Take 1 capsule by mouth Daily.  Dispense: 30 capsule; Refill: 0  -     amitriptyline (ELAVIL) 10 MG tablet; Take 1 tablet by mouth Every Night.  Dispense: 30 tablet; Refill: 0    3. Prediabetes  Comments:  no meds, A1c today 6.2  Orders:  -     POC Glycosylated Hemoglobin (Hb A1C)       There are no Patient Instructions on file for this visit.  Plan of care " reviewed with patient at the conclusion of today's visit. Education was provided regarding diagnosis, management and any prescribed or recommended OTC medications.  Patient verbalizes understanding of and agreement with management plan.    Return in about 1 month (around 11/13/2021) for Recheck this mo with pcp.    *Note that portions of this note were completed with a voice recognition program.  Efforts were made to edit the dictation but occasionally words are transcribed.    Jaquelin Bustamante, APRN

## 2021-11-05 DIAGNOSIS — F31.75 BIPOLAR DISORDER, IN PARTIAL REMISSION, MOST RECENT EPISODE DEPRESSED (HCC): ICD-10-CM

## 2021-11-05 RX ORDER — FLUOXETINE HYDROCHLORIDE 40 MG/1
40 CAPSULE ORAL DAILY
Qty: 90 CAPSULE | Refills: 1 | Status: SHIPPED | OUTPATIENT
Start: 2021-11-05 | End: 2021-12-03 | Stop reason: SDUPTHER

## 2021-11-05 RX ORDER — AMITRIPTYLINE HYDROCHLORIDE 10 MG/1
TABLET, FILM COATED ORAL
Qty: 30 TABLET | Refills: 0 | Status: SHIPPED | OUTPATIENT
Start: 2021-11-05 | End: 2021-11-05

## 2021-11-05 RX ORDER — FLUOXETINE HYDROCHLORIDE 40 MG/1
CAPSULE ORAL
Qty: 30 CAPSULE | Refills: 0 | Status: SHIPPED | OUTPATIENT
Start: 2021-11-05 | End: 2021-11-05 | Stop reason: SDUPTHER

## 2021-11-05 RX ORDER — AMITRIPTYLINE HYDROCHLORIDE 10 MG/1
TABLET, FILM COATED ORAL
Qty: 90 TABLET | Refills: 1 | Status: SHIPPED | OUTPATIENT
Start: 2021-11-05 | End: 2021-12-03 | Stop reason: SDUPTHER

## 2021-12-03 ENCOUNTER — OFFICE VISIT (OUTPATIENT)
Dept: INTERNAL MEDICINE | Facility: CLINIC | Age: 76
End: 2021-12-03

## 2021-12-03 VITALS
DIASTOLIC BLOOD PRESSURE: 82 MMHG | WEIGHT: 143.8 LBS | OXYGEN SATURATION: 93 % | TEMPERATURE: 98 F | SYSTOLIC BLOOD PRESSURE: 128 MMHG | HEIGHT: 65 IN | HEART RATE: 64 BPM | BODY MASS INDEX: 23.96 KG/M2

## 2021-12-03 DIAGNOSIS — K21.9 GASTROESOPHAGEAL REFLUX DISEASE, UNSPECIFIED WHETHER ESOPHAGITIS PRESENT: Chronic | ICD-10-CM

## 2021-12-03 DIAGNOSIS — F31.75 BIPOLAR DISORDER, IN PARTIAL REMISSION, MOST RECENT EPISODE DEPRESSED (HCC): Chronic | ICD-10-CM

## 2021-12-03 PROCEDURE — 99214 OFFICE O/P EST MOD 30 MIN: CPT | Performed by: STUDENT IN AN ORGANIZED HEALTH CARE EDUCATION/TRAINING PROGRAM

## 2021-12-03 RX ORDER — AMITRIPTYLINE HYDROCHLORIDE 10 MG/1
10-20 TABLET, FILM COATED ORAL NIGHTLY PRN
Qty: 180 TABLET | Refills: 1 | Status: SHIPPED | OUTPATIENT
Start: 2021-12-03 | End: 2021-12-30 | Stop reason: SDUPTHER

## 2021-12-03 RX ORDER — FLUOXETINE HYDROCHLORIDE 20 MG/1
20 CAPSULE ORAL DAILY
Qty: 90 CAPSULE | Refills: 1 | Status: SHIPPED | OUTPATIENT
Start: 2021-12-03 | End: 2021-12-10 | Stop reason: SDUPTHER

## 2021-12-03 RX ORDER — PANTOPRAZOLE SODIUM 20 MG/1
20 TABLET, DELAYED RELEASE ORAL DAILY
Qty: 90 TABLET | Refills: 1 | Status: SHIPPED | OUTPATIENT
Start: 2021-12-03 | End: 2022-03-11 | Stop reason: DRUGHIGH

## 2021-12-03 NOTE — PROGRESS NOTES
Chief Complaint  Cierra Alvarez is a 76 y.o. female presenting for Med Refill.     From [facility]. Lives by herself, single, no children. Niece in [place] is closest family member - symptoms of Huntingtons.  Patient has 2 siblings with Juab's.  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 Lives by herself, single, no children. Niece in [place] is closest family member - symptoms of Huntingtons.  Patient has 2 siblings with Juab's.  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    Patient has a past medical history of bipolar disorder, depression, GERD, osteoarthritis, syringomyelia (per outside records, asymptomatic currently), prediabetes (denies T2DM or A1c over 6.4), psoriasis, vitamin D deficiency and elevated blood pressures.    History of Present Illness  Patient is here for follow-up.  She was not able to come for the visit that was planned earlier because her 13-year-old cat Denis had to be euthanized.  She was very down and grieving, stayed in bed for quite a while afterwards before getting back on her feet.  Since she has required to small kittens that had bonded after birth and she wanted to keep them together.  This is greatly helped her and her mood is back up.  At this time she feels the mood this may be a little bit on the higher and, potentially mildly hypomanic, and she would like to decrease her Prozac from 40 to 20 mg.    She continues to take antacids, she ran out of pantoprazole a few days ago and started taking over-the-counter famotidine instead.  Her acid reflux started coming back, she is requesting to get back on pantoprazole.    Patient does not want to do lung cancer screening at this time.    The following portions of the patient's history were reviewed and updated as appropriate: allergies, current medications, past family history, past medical history, past social history, past surgical history and problem  "list.    Objective  /82 (BP Location: Left arm, Patient Position: Sitting, Cuff Size: Adult)   Pulse 64   Temp 98 °F (36.7 °C) (Temporal)   Ht 165.1 cm (65\")   Wt 65.2 kg (143 lb 12.8 oz)   SpO2 93%   BMI 23.93 kg/m²     Physical Exam  Constitutional:       Appearance: Normal appearance.   HENT:      Nose: Congestion present.   Eyes:      Extraocular Movements: Extraocular movements intact.      Conjunctiva/sclera: Conjunctivae normal.   Neurological:      Mental Status: She is alert and oriented to person, place, and time. Mental status is at baseline.      Gait: Gait normal.   Psychiatric:         Attention and Perception: Attention normal.         Mood and Affect: Mood is elated. Mood is not depressed.         Speech: Speech is rapid and pressured.         Behavior: Behavior normal. Behavior is cooperative.         Thought Content: Thought content normal.         Cognition and Memory: Cognition normal.         Judgment: Judgment normal.         Assessment/Plan   1. Bipolar disorder, in partial remission, most recent episode depressed (HCC)  Patient appears mildly hypomanic today.  I have had a long discussion about this before, I recommended she be on Seroquel and not Prozac.  However patient was quite adamant about it and wanted to be on Prozac and also Elavil for as needed.  Cc at this time that it makes sense to reduce the Prozac dose, she is requesting it herself.  She also has been manic long time ago and does not feel she is at that point now.  We will reduce the dose of Prozac now, if it would be persistent hypomania then we should consider reducing it.  - FLUoxetine (PROzac) 20 MG capsule; Take 1 capsule by mouth Daily.  Dispense: 90 capsule; Refill: 1  - amitriptyline (ELAVIL) 10 MG tablet; Take 1-2 tablets by mouth At Night As Needed for Sleep.  Dispense: 180 tablet; Refill: 1    2. Gastroesophageal reflux disease, unspecified whether esophagitis present  We will decrease her dose of " pantoprazole from 40 to 20 mg.  If she tolerates this we might look at reducing further.  - pantoprazole (PROTONIX) 20 MG EC tablet; Take 1 tablet by mouth Daily.  Dispense: 90 tablet; Refill: 1      Return in about 2 months (around 2/3/2022) for Recheck.    Future Appointments       Provider Department Center    2/2/2022 10:30 AM Pietro Barber MD [facility] INTERNAL MEDICINE [facility]            Pietro Barber MD  Family Medicine  12/03/2021

## 2021-12-10 ENCOUNTER — TELEPHONE (OUTPATIENT)
Dept: INTERNAL MEDICINE | Facility: CLINIC | Age: 76
End: 2021-12-10

## 2021-12-10 DIAGNOSIS — F31.75 BIPOLAR DISORDER, IN PARTIAL REMISSION, MOST RECENT EPISODE DEPRESSED: Chronic | ICD-10-CM

## 2021-12-10 DIAGNOSIS — F31.75 BIPOLAR DISORDER, IN PARTIAL REMISSION, MOST RECENT EPISODE DEPRESSED: Primary | ICD-10-CM

## 2021-12-10 RX ORDER — FLUOXETINE 10 MG/1
20 CAPSULE ORAL DAILY
Qty: 30 CAPSULE | Refills: 2 | Status: SHIPPED | OUTPATIENT
Start: 2021-12-10 | End: 2022-01-03 | Stop reason: SDUPTHER

## 2021-12-10 NOTE — TELEPHONE ENCOUNTER
Caller: Cierra Alvarez    Relationship: Self    Best call back number: [phone]     What orders are you requesting (i.e. lab or imaging): LITHIUM AND ELECTROLYTES BLOOD WORK     In what timeframe would the patient need to come in: NEXT WEEK     Where will you receive your lab/imaging services: [facility]     Additional notes: PATIENT STATES IT HAS BEEN AT LEAST A YEAR SINCE HAVING THESE LABS DRAWN.     CALL: YES; WHEN LABS ARE ORDERED

## 2021-12-10 NOTE — TELEPHONE ENCOUNTER
Left  for pt to return call, HUB can read, I have changed the Prozac from 20 to 10 mg and sent it to her pharmacy. I think it is fine that we go down to 10 mg, her mood was a little bit elevated recently. Also I am fine with her not taking pantoprazole and taking Pepcid instead, which is a better option. Please let her know. Thanks

## 2021-12-10 NOTE — TELEPHONE ENCOUNTER
I have changed the Prozac from 20 to 10 mg and sent it to her pharmacy. I think it is fine that we go down to 10 mg, her mood was a little bit elevated recently. Also I am fine with her not taking pantoprazole and taking Pepcid instead, which is a better option. Please let her know. Thanks

## 2021-12-10 NOTE — TELEPHONE ENCOUNTER
Caller: Cierra Alvarez    Relationship: Self    Best call back number: [phone]     What medications are you currently taking:   Current Outpatient Medications on File Prior to Visit   Medication Sig Dispense Refill   • amitriptyline (ELAVIL) 10 MG tablet Take 1-2 tablets by mouth At Night As Needed for Sleep. 180 tablet 1   • Cholecalciferol (vitamin D3) 125 MCG (5000 UT) tablet tablet Take 5,000 Units by mouth Daily.     • FLUoxetine (PROzac) 20 MG capsule Take 1 capsule by mouth Daily. 90 capsule 1   • gabapentin (NEURONTIN) 600 MG tablet Take 600 mg by mouth 4 (Four) Times a Day.     • lithium carbonate 150 MG capsule Takes 3 capsules once a day     • pantoprazole (PROTONIX) 20 MG EC tablet Take 1 tablet by mouth Daily. 90 tablet 1     No current facility-administered medications on file prior to visit.          When did you start taking these medications:     Which medication are you concerned about: FLUoxetine (PROzac) 20 MG capsule    Who prescribed you this medication: PCP    What are your concerns: PATIENT STATES AT HER LAST APPOINTMENT ON 12/3/21 PCP DECREASED DOSAGE OF PRESCRIPTION FROM 40 MG TO 20 MG DUE TO A POSSIBLE PSORIASIS FLARE UP. PATIENT STATES SHE HAS NOT BEEN TAKING THE PROZAC SINCE 12/2/21 AND THE PSORIASIS HAS GONE AWAY SINCE. PATIENT WOULD LIKE TO REQUEST PRESCRIPTION BE REDUCED TO 10 MG INSTEAD OF 20 MG AND IF NEED BE THE DOSAGE CAN BE INCREASED TO 20 MG. PATIENT STATES SHE  DID NOT  pantoprazole (PROTONIX) 20 MG EC tablet FROM PHARMACY DUE TO PEPCID WORKING WELL OR FLUoxetine (PROzac) 20 MG capsule DUE TO NOT FEELING DEPRESSED AT THIS TIME.     How long have you had these concerns:     [id number] [place] [place] [facility] [address] [facility] [phone] [facility] [phone]   [phone]

## 2021-12-10 NOTE — TELEPHONE ENCOUNTER
Caller: Cierra Alvarez    Relationship: Self    Best call back number: [phone]    What was the call regarding:   PATIENT WAS INFORMED OF THE MESSAGE AND UNDERSTANDS     Steffanie Galvez LPNLicensed NurseSigned  [id number]                    Left  for pt to return call, HUB can read, I have changed the Prozac from 20 to 10 mg and sent it to her pharmacy. I think it is fine that we go down to 10 mg, her mood was a little bit elevated recently. Also I am fine with her not taking pantoprazole and taking Pepcid instead, which is a better option. Please let her know. Thanks

## 2021-12-30 DIAGNOSIS — F31.75 BIPOLAR DISORDER, IN PARTIAL REMISSION, MOST RECENT EPISODE DEPRESSED: Chronic | ICD-10-CM

## 2021-12-30 RX ORDER — AMITRIPTYLINE HYDROCHLORIDE 10 MG/1
10-20 TABLET, FILM COATED ORAL NIGHTLY PRN
Qty: 60 TABLET | Refills: 0 | Status: SHIPPED | OUTPATIENT
Start: 2021-12-30 | End: 2022-03-14 | Stop reason: SDUPTHER

## 2022-01-03 DIAGNOSIS — F31.75 BIPOLAR DISORDER, IN PARTIAL REMISSION, MOST RECENT EPISODE DEPRESSED: Chronic | ICD-10-CM

## 2022-01-03 RX ORDER — FLUOXETINE 10 MG/1
20 CAPSULE ORAL DAILY
Qty: 180 CAPSULE | Refills: 0 | Status: SHIPPED | OUTPATIENT
Start: 2022-01-03 | End: 2022-03-14 | Stop reason: SDUPTHER

## 2022-03-10 ENCOUNTER — TELEPHONE (OUTPATIENT)
Dept: INTERNAL MEDICINE | Facility: CLINIC | Age: 77
End: 2022-03-10

## 2022-03-10 NOTE — TELEPHONE ENCOUNTER
Pt called said she has hurt her left knee from doing exercise. Pt states that her knee is larger than normal. Pt is having a lot of pain when she walks.

## 2022-03-10 NOTE — TELEPHONE ENCOUNTER
Patient returned phone call. Patient agreed that she wanted to be seen for her left knee pain.  Appointment made for 3/11/22 at 9:15am with Dr. Barber.

## 2022-03-11 ENCOUNTER — OFFICE VISIT (OUTPATIENT)
Dept: INTERNAL MEDICINE | Facility: CLINIC | Age: 77
End: 2022-03-11

## 2022-03-11 VITALS
OXYGEN SATURATION: 98 % | DIASTOLIC BLOOD PRESSURE: 80 MMHG | BODY MASS INDEX: 25.33 KG/M2 | HEIGHT: 65 IN | WEIGHT: 152 LBS | TEMPERATURE: 97.5 F | SYSTOLIC BLOOD PRESSURE: 136 MMHG | HEART RATE: 73 BPM

## 2022-03-11 DIAGNOSIS — R73.03 PREDIABETES: Chronic | ICD-10-CM

## 2022-03-11 DIAGNOSIS — E66.3 OVERWEIGHT (BMI 25.0-29.9): ICD-10-CM

## 2022-03-11 DIAGNOSIS — F31.32 BIPOLAR AFFECTIVE DISORDER, CURRENTLY DEPRESSED, MODERATE: Chronic | ICD-10-CM

## 2022-03-11 DIAGNOSIS — F17.210 CIGARETTE SMOKER: ICD-10-CM

## 2022-03-11 DIAGNOSIS — R03.0 BORDERLINE HYPERTENSION: ICD-10-CM

## 2022-03-11 DIAGNOSIS — M25.562 ACUTE PAIN OF LEFT KNEE: Primary | ICD-10-CM

## 2022-03-11 LAB
EXPIRATION DATE: NORMAL
HBA1C MFR BLD: 5.5 %
Lab: NORMAL

## 2022-03-11 PROCEDURE — 99214 OFFICE O/P EST MOD 30 MIN: CPT | Performed by: STUDENT IN AN ORGANIZED HEALTH CARE EDUCATION/TRAINING PROGRAM

## 2022-03-11 PROCEDURE — 3044F HG A1C LEVEL LT 7.0%: CPT | Performed by: STUDENT IN AN ORGANIZED HEALTH CARE EDUCATION/TRAINING PROGRAM

## 2022-03-11 PROCEDURE — 83036 HEMOGLOBIN GLYCOSYLATED A1C: CPT | Performed by: STUDENT IN AN ORGANIZED HEALTH CARE EDUCATION/TRAINING PROGRAM

## 2022-03-11 RX ORDER — PANTOPRAZOLE SODIUM 40 MG/1
40 TABLET, DELAYED RELEASE ORAL DAILY
COMMUNITY
Start: 2022-03-05 | End: 2022-05-05 | Stop reason: SDUPTHER

## 2022-03-11 NOTE — PROGRESS NOTES
"Chief Complaint  Cierra Alvarez is a 76 y.o. female presenting for Pain (Left knee).     From [facility]. Lives by herself, single, no children. Niece in [place] is closest family member - symptoms of Huntingtons.  Patient has 2 siblings with Rover's.  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 Lives by herself, single, no children. Niece in [place] is closest family member - symptoms of Huntingtons.  Patient has 2 siblings with Rover's.  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     Patient has a past medical history of bipolar disorder, depression, GERD, osteoarthritis, syringomyelia (per outside records, asymptomatic currently), prediabetes (denies T2DM or A1c over 6.4), psoriasis, vitamin D deficiency and elevated blood pressures.    History of Present Illness  Patient is here due to 1 month of left knee pain.  This started after she had been doing squats.  She is very physically active and walks as much as she can.  Has been taking Tylenol for the pain, but it just keeps bothering her.  She never had any injuries in the past, no x-rays done in the past.    Patient otherwise doing well, she states her mood is now stable and she is content with her medications.  She has cut down on the Prozac to 10 mg and takes only 1 day/week.  She still takes the Elavil regularly.    Patient has not come for the blood work that was ordered, she states she will do so within a few days at the latest.    The following portions of the patient's history were reviewed and updated as appropriate: allergies, current medications, past family history, past medical history, past social history, past surgical history and problem list.    Objective  /80 (BP Location: Left arm, Patient Position: Sitting, Cuff Size: Adult)   Pulse 73   Temp 97.5 °F (36.4 °C) (Temporal)   Ht 165.1 cm (65\")   Wt 68.9 kg (152 lb)   SpO2 98%   BMI 25.29 kg/m²     Physical " Exam  Vitals reviewed.   Constitutional:       Appearance: Normal appearance.   HENT:      Head: Normocephalic and atraumatic.      Nose: No congestion.   Eyes:      Extraocular Movements: Extraocular movements intact.      Conjunctiva/sclera: Conjunctivae normal.   Cardiovascular:      Rate and Rhythm: Normal rate and regular rhythm.      Heart sounds: Normal heart sounds. No murmur heard.  Pulmonary:      Effort: Pulmonary effort is normal.      Breath sounds: Wheezing present.      Comments: Acute scattered wheezes, good air entry all over  Abdominal:      General: There is no distension.      Palpations: Abdomen is soft. There is no mass.      Tenderness: There is no abdominal tenderness.   Musculoskeletal:         General: Swelling, tenderness and deformity present.      Cervical back: Neck supple.      Right lower leg: No edema.      Left lower leg: No edema.      Comments: Left knee is diffusely swollen, no redness.  No convincing effusion.  There is crepitus with flexion of the knee, he is able to extend almost fully, able to flex to about 90 degrees with pain.  He walks with a limp and is having difficulties getting on and off the exam table due to her knee.  The right knee is not bothering her   Skin:     General: Skin is warm and dry.   Neurological:      Mental Status: She is alert and oriented to person, place, and time. Mental status is at baseline.      Motor: No weakness.      Gait: Gait abnormal.   Psychiatric:         Behavior: Behavior normal.         Thought Content: Thought content normal.         Assessment/Plan   1. Acute pain of left knee  Very likely osteoarthritis.  Her exercises might have triggered this pain.  We will do x-ray and physical therapy.  I also recommend taking Tylenol for pain.  I have counseled her on avoiding squats, she can do gentle exercises, walking, swimming that will be beneficial for the knee.  She could also benefit from strengthening exercises.  - XR Knee 1 or 2  View Left; Future  - Ambulatory Referral to Physical Therapy Evaluate and treat    2. Bipolar affective disorder, currently depressed, moderate (HCC)  Patient appears stable today, no concern for depression or hypomania.  She did get hypomanic on higher dose of Prozac.  I have explained to her that she needs to go for lithium check, and we also need to check her thyroid and kidneys.  Patient verbalizes understanding and states she will go within a few days.  - TSH Rfx On Abnormal To Free T4; Future    3. Prediabetes  Hemoglobin A1C   Date Value Ref Range Status   03/11/2022 5.5 % Final   10/13/2021 6.2 (A) 4.8 - 5.6 % Final   05/24/2021 5.7 % Final   Significantly improved, currently in the normal range.  - POC Glycosylated Hemoglobin (Hb A1C)    4. Borderline hypertension  BP Readings from Last 3 Encounters:   03/11/22 136/80   12/03/21 128/82   10/13/21 132/70   Blood pressure now at goal off medications.  Continue on current regimen.    5. Cigarette smoker  Patient not ready to quit.  I have counseled her on cessation    6. Overweight (BMI 25.0-29.9)  Patient's Body mass index is 25.29 kg/m². indicating that she is overweight (BMI 25-29.9). Patient's (Body mass index is 25.29 kg/m².) indicates that they are overweight with health conditions that include GERD and osteoarthritis . Weight is unchanged. BMI is is above average; BMI management plan is completed. We discussed Exercise as tolerated. .    Total time spent on chart review, charting and face-to-face with patient 31 minutes.    Return in about 3 months (around 6/11/2022) for Recheck.    Future Appointments       Provider Department Center    6/10/2022 9:15 AM Pietro Barber MD [facility] INTERNAL MEDICINE [facility]          Patient has been erroneously marked as diabetic. Based on the available clinical information, she does not have diabetes and should therefore be excluded from diabetic health maintenance and quality measures for the  remainder of the reporting period.      Pietro Barber MD  Family Medicine  03/11/2022

## 2022-03-14 DIAGNOSIS — F31.75 BIPOLAR DISORDER, IN PARTIAL REMISSION, MOST RECENT EPISODE DEPRESSED: Chronic | ICD-10-CM

## 2022-03-14 RX ORDER — FLUOXETINE 10 MG/1
10 CAPSULE ORAL DAILY
Qty: 90 CAPSULE | Refills: 0 | Status: SHIPPED | OUTPATIENT
Start: 2022-03-14 | End: 2022-03-17

## 2022-03-14 RX ORDER — AMITRIPTYLINE HYDROCHLORIDE 10 MG/1
10-20 TABLET, FILM COATED ORAL NIGHTLY PRN
Qty: 120 TABLET | Refills: 1 | Status: SHIPPED | OUTPATIENT
Start: 2022-03-14 | End: 2022-03-16 | Stop reason: SDUPTHER

## 2022-03-14 NOTE — TELEPHONE ENCOUNTER
Caller: Cierra Alvarez    Relationship: Self    Best call back number: [phone]    Requested Prescriptions:   Requested Prescriptions     Pending Prescriptions Disp Refills   • amitriptyline (ELAVIL) 10 MG tablet 60 tablet 0     Sig: Take 1-2 tablets by mouth At Night As Needed for Sleep.   • FLUoxetine (PROzac) 10 MG capsule 180 capsule 0     Sig: Take 2 capsules by mouth Daily.        [id number] [place], [place] [facility] [address] [facility] [phone] [facility] [phone] FX     Additional details provided by patient: PATIENT STATES THAT SHE IS OUT OF THESE MEDICATION.    Does the patient have less than a 3 day supply:  [x] Yes  [] No    Lux Fournier Rep   03/14/22 11:51 EDT

## 2022-03-16 ENCOUNTER — TELEPHONE (OUTPATIENT)
Dept: INTERNAL MEDICINE | Facility: CLINIC | Age: 77
End: 2022-03-16

## 2022-03-16 DIAGNOSIS — F31.75 BIPOLAR DISORDER, IN PARTIAL REMISSION, MOST RECENT EPISODE DEPRESSED: Chronic | ICD-10-CM

## 2022-03-16 RX ORDER — AMITRIPTYLINE HYDROCHLORIDE 10 MG/1
20-30 TABLET, FILM COATED ORAL NIGHTLY PRN
Qty: 270 TABLET | Refills: 1 | Status: SHIPPED | OUTPATIENT
Start: 2022-03-16 | End: 2022-07-26 | Stop reason: SDUPTHER

## 2022-03-16 NOTE — TELEPHONE ENCOUNTER
Caller: Cierra Alvarez    Relationship: Self    Best call back number: [phone]    Requested Prescriptions:   Requested Prescriptions     Pending Prescriptions Disp Refills   • amitriptyline (ELAVIL) 10 MG tablet 120 tablet 1     Sig: Take 1-2 tablets by mouth At Night As Needed for Sleep.        Pharmacy where request should be sent:  [facility] [phone]    Additional details provided by patient: PATIENT HAS INCREASED HER DOSAGE BECAUSE OF ACUTE KNEE PAIN. PATIENT HAS BEEN TAKING 1 DURING THE DAY AND 2 AT NIGHT. PATIENT IS OUT OF MEDICATION. PLEASE ADVISE    Does the patient have less than a 3 day supply:  [x] Yes  [] No    Lux Grace Rep   03/16/22 13:20 EDT

## 2022-03-17 DIAGNOSIS — F31.75 BIPOLAR DISORDER, IN PARTIAL REMISSION, MOST RECENT EPISODE DEPRESSED: Chronic | ICD-10-CM

## 2022-03-17 RX ORDER — FLUOXETINE 10 MG/1
CAPSULE ORAL
Qty: 90 CAPSULE | Refills: 1 | Status: SHIPPED | OUTPATIENT
Start: 2022-03-17 | End: 2022-09-27

## 2022-05-05 NOTE — TELEPHONE ENCOUNTER
Caller: Cierra Alvarez    Relationship: Self    Best call back number: [phone]     Requested Prescriptions:   Requested Prescriptions     Pending Prescriptions Disp Refills   • lithium carbonate 150 MG capsule     • pantoprazole (PROTONIX) 40 MG EC tablet       Sig: Take 1 tablet by mouth Daily.        [facility] sent[id number] [place], [place] - [address] [facility] [phone] [facility] [phone] FX     Additional details provided by patient:     Does the patient have less than a 3 day supply:  [x] Yes  [] No    Lux Mejia Rep   05/05/22 15:40 EDT

## 2022-05-06 ENCOUNTER — TELEPHONE (OUTPATIENT)
Dept: INTERNAL MEDICINE | Facility: CLINIC | Age: 77
End: 2022-05-06

## 2022-05-06 ENCOUNTER — HOSPITAL ENCOUNTER (OUTPATIENT)
Dept: GENERAL RADIOLOGY | Facility: HOSPITAL | Age: 77
Discharge: HOME | End: 2022-05-06

## 2022-05-06 ENCOUNTER — LAB (OUTPATIENT)
Dept: LAB | Facility: HOSPITAL | Age: 77
End: 2022-05-06

## 2022-05-06 DIAGNOSIS — F31.32 BIPOLAR AFFECTIVE DISORDER, CURRENTLY DEPRESSED, MODERATE: Chronic | ICD-10-CM

## 2022-05-06 DIAGNOSIS — M17.12 PRIMARY OSTEOARTHRITIS OF LEFT KNEE: Primary | ICD-10-CM

## 2022-05-06 DIAGNOSIS — M25.562 ACUTE PAIN OF LEFT KNEE: ICD-10-CM

## 2022-05-06 DIAGNOSIS — M25.462 EFFUSION OF LEFT KNEE: ICD-10-CM

## 2022-05-06 PROCEDURE — 73560 X-RAY EXAM OF KNEE 1 OR 2: CPT

## 2022-05-06 PROCEDURE — 84443 ASSAY THYROID STIM HORMONE: CPT

## 2022-05-06 RX ORDER — LITHIUM CARBONATE 150 MG/1
150 CAPSULE ORAL
Qty: 90 CAPSULE | Refills: 1 | Status: SHIPPED | OUTPATIENT
Start: 2022-05-06 | End: 2022-08-15 | Stop reason: SDUPTHER

## 2022-05-06 RX ORDER — PANTOPRAZOLE SODIUM 40 MG/1
40 TABLET, DELAYED RELEASE ORAL DAILY
Qty: 30 TABLET | Refills: 1 | Status: SHIPPED | OUTPATIENT
Start: 2022-05-06 | End: 2022-06-02

## 2022-05-06 NOTE — TELEPHONE ENCOUNTER
----- Message from Pietro Barber MD sent at 5/6/2022  2:44 PM EDT -----  I have placed referral to Ortho

## 2022-05-07 LAB — TSH SERPL DL<=0.05 MIU/L-ACNC: 1.45 UIU/ML (ref 0.27–4.2)

## 2022-05-09 ENCOUNTER — TELEPHONE (OUTPATIENT)
Dept: INTERNAL MEDICINE | Facility: CLINIC | Age: 77
End: 2022-05-09

## 2022-05-09 NOTE — TELEPHONE ENCOUNTER
----- Message from Pietro Barber MD sent at 5/9/2022  8:03 AM EDT -----  Please let patient know that the thyroid test came back normal.

## 2022-05-09 NOTE — TELEPHONE ENCOUNTER
Patient was returning a missed call about her lab results. I saw the note from Dr Barber stating that her thyroid test results came back normal, patient verbalized understanding. She had asked if we had tested her lithium levels but I saw no labs in her chart about that, patient declined my offer to send a message about that to the clinical pool.

## 2022-05-10 ENCOUNTER — TELEPHONE (OUTPATIENT)
Dept: INTERNAL MEDICINE | Facility: CLINIC | Age: 77
End: 2022-05-10

## 2022-06-02 ENCOUNTER — OFFICE VISIT (OUTPATIENT)
Dept: ORTHOPEDIC SURGERY | Facility: CLINIC | Age: 77
End: 2022-06-02

## 2022-06-02 VITALS — BODY MASS INDEX: 24.16 KG/M2 | WEIGHT: 145 LBS | HEIGHT: 65 IN

## 2022-06-02 DIAGNOSIS — K21.9 GASTRO-ESOPHAGEAL REFLUX DISEASE WITHOUT ESOPHAGITIS: ICD-10-CM

## 2022-06-02 DIAGNOSIS — M17.12 PRIMARY OSTEOARTHRITIS OF LEFT KNEE: ICD-10-CM

## 2022-06-02 DIAGNOSIS — M25.562 LEFT KNEE PAIN, UNSPECIFIED CHRONICITY: Primary | ICD-10-CM

## 2022-06-02 PROCEDURE — 99204 OFFICE O/P NEW MOD 45 MIN: CPT | Performed by: ORTHOPAEDIC SURGERY

## 2022-06-02 PROCEDURE — 20610 DRAIN/INJ JOINT/BURSA W/O US: CPT | Performed by: ORTHOPAEDIC SURGERY

## 2022-06-02 RX ORDER — LIDOCAINE HYDROCHLORIDE 10 MG/ML
3 INJECTION, SOLUTION EPIDURAL; INFILTRATION; INTRACAUDAL; PERINEURAL
Status: COMPLETED | OUTPATIENT
Start: 2022-06-02 | End: 2022-06-02

## 2022-06-02 RX ORDER — PANTOPRAZOLE SODIUM 40 MG/1
TABLET, DELAYED RELEASE ORAL
Qty: 90 TABLET | Refills: 1 | OUTPATIENT
Start: 2022-06-02

## 2022-06-02 RX ORDER — PANTOPRAZOLE SODIUM 40 MG/1
TABLET, DELAYED RELEASE ORAL
Qty: 30 TABLET | Refills: 1 | Status: SHIPPED | OUTPATIENT
Start: 2022-06-02 | End: 2022-07-05 | Stop reason: SDUPTHER

## 2022-06-02 RX ORDER — TRIAMCINOLONE ACETONIDE 40 MG/ML
40 INJECTION, SUSPENSION INTRA-ARTICULAR; INTRAMUSCULAR
Status: COMPLETED | OUTPATIENT
Start: 2022-06-02 | End: 2022-06-02

## 2022-06-02 RX ADMIN — TRIAMCINOLONE ACETONIDE 40 MG: 40 INJECTION, SUSPENSION INTRA-ARTICULAR; INTRAMUSCULAR at 10:17

## 2022-06-02 RX ADMIN — LIDOCAINE HYDROCHLORIDE 3 ML: 10 INJECTION, SOLUTION EPIDURAL; INFILTRATION; INTRACAUDAL; PERINEURAL at 10:17

## 2022-06-02 NOTE — PROGRESS NOTES
Procedure   Large Joint Arthrocentesis: L knee  Date/Time: 6/2/2022 10:17 AM  Consent given by: patient  Site marked: site marked  Timeout: Immediately prior to procedure a time out was called to verify the correct patient, procedure, equipment, support staff and site/side marked as required   Supporting Documentation  Indications: pain   Procedure Details  Location: knee - L knee  Preparation: Patient was prepped and draped in the usual sterile fashion  Needle size: 25 G  Approach: anterolateral  Medications administered: 40 mg triamcinolone acetonide 40 MG/ML; 3 mL lidocaine PF 1% 1 %  Patient tolerance: patient tolerated the procedure well with no immediate complications

## 2022-06-09 ENCOUNTER — TELEPHONE (OUTPATIENT)
Dept: INTERNAL MEDICINE | Facility: CLINIC | Age: 77
End: 2022-06-09

## 2022-06-09 NOTE — TELEPHONE ENCOUNTER
Caller: Cierra Alvarez    Relationship: Self    Best call back number: [phone]    What is the best time to reach you: ANYTIME     Who are you requesting to speak with (clinical staff, provider,  specific staff member): CLINICAL STAFF     What was the call regarding: PATIENT STATES THAT SHE HAS BEEN GETTING INJECTIONS IN HER KNEE AND SHE HAS BEEN FEELING A LOT BETTER.     Do you require a callback: NO

## 2022-07-05 ENCOUNTER — TELEPHONE (OUTPATIENT)
Dept: INTERNAL MEDICINE | Facility: CLINIC | Age: 77
End: 2022-07-05

## 2022-07-05 RX ORDER — PANTOPRAZOLE SODIUM 40 MG/1
40 TABLET, DELAYED RELEASE ORAL DAILY
Qty: 30 TABLET | Refills: 1 | Status: SHIPPED | OUTPATIENT
Start: 2022-07-05 | End: 2022-07-27

## 2022-07-05 NOTE — TELEPHONE ENCOUNTER
Caller: Cierra Alvarez    Relationship to patient: Self    Best call back number: [phone]    HUB READ FOLLOWING MESSAGE TO PATIENT    No VM set up. If pt returns call HUB can read rx was sent in 6/2.

## 2022-07-05 NOTE — TELEPHONE ENCOUNTER
Caller: CarlitotateCierra    Relationship: Self    Best call back number: [phone]    Requested Prescriptions:   Requested Prescriptions     Pending Prescriptions Disp Refills   • pantoprazole (PROTONIX) 40 MG EC tablet 30 tablet 1     Sig: Take 1 tablet by mouth Daily.        [id number] [place], [place] - [address] [facility] [phone] [facility] [phone] FX     Additional details provided by patient: HAS 7 DAYS LEFT    Does the patient have less than a 3 day supply:  [] Yes  [x] No    Lux ROWELL Rep   07/05/22 13:52 EDT

## 2022-07-05 NOTE — TELEPHONE ENCOUNTER
Caller: Cierra Alvarez    Relationship: Self    Best call back number: [phone]    What was the call regarding:   PATIENT WAS INFORMED OF THE MESSAGE AND STATED THAT SHE SPOKE WITH THE PHARMACY AND HER MEDICATION IF READY FOR      Lyly Olivia RegSched RepStaffSigned  [id number]                            Caller: Cierra Alvarez     Relationship to patient: Self     Best call back number: [phone]     HUB READ FOLLOWING MESSAGE TO PATIENT     No VM set up. If pt returns call HUB can read rx was sent in 6/2.

## 2022-07-26 DIAGNOSIS — F31.75 BIPOLAR DISORDER, IN PARTIAL REMISSION, MOST RECENT EPISODE DEPRESSED: Chronic | ICD-10-CM

## 2022-07-26 RX ORDER — AMITRIPTYLINE HYDROCHLORIDE 10 MG/1
20-30 TABLET, FILM COATED ORAL NIGHTLY PRN
Qty: 270 TABLET | Refills: 1 | Status: SHIPPED | OUTPATIENT
Start: 2022-07-26 | End: 2022-11-30

## 2022-07-26 NOTE — TELEPHONE ENCOUNTER
Caller: Cierra Alvarez    Relationship: Self    Best call back number: [phone]    Requested Prescriptions:   Requested Prescriptions     Pending Prescriptions Disp Refills   • amitriptyline (ELAVIL) 10 MG tablet 270 tablet 1     Sig: Take 2-3 tablets by mouth At Night As Needed for Sleep.        Pharm[facility] where [facility] sent[id number] [place], [place] - [address] [phone] [facility] [phone]      Additional details provided by patient:     Does the patient have less than a 3 day supply:  [] Yes  [x] No    Dejah Efrain   07/26/22 15:09 EDT

## 2022-07-27 RX ORDER — PANTOPRAZOLE SODIUM 40 MG/1
TABLET, DELAYED RELEASE ORAL
Qty: 30 TABLET | Refills: 1 | Status: SHIPPED | OUTPATIENT
Start: 2022-07-27 | End: 2022-11-28

## 2022-07-27 NOTE — TELEPHONE ENCOUNTER
Rx Refill Note  Requested Prescriptions     Pending Prescriptions Disp Refills   • pantoprazole (PROTONIX) 40 MG EC tablet [Pharmacy Med Name: PANTOPRAZOLE SOD DR 40 MG TAB] 30 tablet 1     Sig: TAKE 1 TABLET BY MOUTH EVERY DAY      Last office visit with prescribing clinician: 3/11/2022      Next office visit with prescribing clinician: Visit date not found            Estelita Harry LPN  07/27/22, 13:53 EDT

## 2022-08-15 RX ORDER — LITHIUM CARBONATE 150 MG/1
150 CAPSULE ORAL
Qty: 270 CAPSULE | Refills: 0 | Status: SHIPPED | OUTPATIENT
Start: 2022-08-15 | End: 2022-10-21

## 2022-09-27 DIAGNOSIS — F31.75 BIPOLAR DISORDER, IN PARTIAL REMISSION, MOST RECENT EPISODE DEPRESSED: Chronic | ICD-10-CM

## 2022-09-27 RX ORDER — FLUOXETINE 10 MG/1
CAPSULE ORAL
Qty: 90 CAPSULE | Refills: 1 | Status: SHIPPED | OUTPATIENT
Start: 2022-09-27

## 2022-10-21 ENCOUNTER — LAB (OUTPATIENT)
Dept: LAB | Facility: HOSPITAL | Age: 77
End: 2022-10-21

## 2022-10-21 ENCOUNTER — OFFICE VISIT (OUTPATIENT)
Dept: INTERNAL MEDICINE | Facility: CLINIC | Age: 77
End: 2022-10-21

## 2022-10-21 VITALS
HEART RATE: 68 BPM | WEIGHT: 154.4 LBS | HEIGHT: 64 IN | SYSTOLIC BLOOD PRESSURE: 136 MMHG | DIASTOLIC BLOOD PRESSURE: 84 MMHG | BODY MASS INDEX: 26.36 KG/M2 | TEMPERATURE: 97.7 F

## 2022-10-21 DIAGNOSIS — R03.0 BORDERLINE HYPERTENSION: ICD-10-CM

## 2022-10-21 DIAGNOSIS — R73.03 PREDIABETES: ICD-10-CM

## 2022-10-21 DIAGNOSIS — F31.75 BIPOLAR DISORDER, IN PARTIAL REMISSION, MOST RECENT EPISODE DEPRESSED: ICD-10-CM

## 2022-10-21 DIAGNOSIS — E66.3 OVERWEIGHT (BMI 25.0-29.9): ICD-10-CM

## 2022-10-21 DIAGNOSIS — F17.210 CIGARETTE SMOKER: ICD-10-CM

## 2022-10-21 DIAGNOSIS — Z00.00 ENCOUNTER FOR SUBSEQUENT ANNUAL WELLNESS VISIT (AWV) IN MEDICARE PATIENT: Primary | ICD-10-CM

## 2022-10-21 PROCEDURE — 83036 HEMOGLOBIN GLYCOSYLATED A1C: CPT

## 2022-10-21 PROCEDURE — 80053 COMPREHEN METABOLIC PANEL: CPT

## 2022-10-21 PROCEDURE — 80178 ASSAY OF LITHIUM: CPT

## 2022-10-21 PROCEDURE — 1170F FXNL STATUS ASSESSED: CPT | Performed by: STUDENT IN AN ORGANIZED HEALTH CARE EDUCATION/TRAINING PROGRAM

## 2022-10-21 PROCEDURE — G0439 PPPS, SUBSEQ VISIT: HCPCS | Performed by: STUDENT IN AN ORGANIZED HEALTH CARE EDUCATION/TRAINING PROGRAM

## 2022-10-21 PROCEDURE — 36415 COLL VENOUS BLD VENIPUNCTURE: CPT

## 2022-10-21 PROCEDURE — 1159F MED LIST DOCD IN RCRD: CPT | Performed by: STUDENT IN AN ORGANIZED HEALTH CARE EDUCATION/TRAINING PROGRAM

## 2022-10-21 RX ORDER — LITHIUM CARBONATE 150 MG/1
450 CAPSULE ORAL DAILY
Qty: 270 CAPSULE | Refills: 1 | Status: SHIPPED | COMMUNITY
Start: 2022-10-21 | End: 2023-01-30 | Stop reason: SDUPTHER

## 2022-10-21 NOTE — PROGRESS NOTES
QUICK REFERENCE INFORMATION:  The ABCs of the Annual Wellness Visit    Subsequent Medicare Wellness Visit    From [facility]. Lives by herself, single, no children. Niece in [place] is closest family member - symptoms of [place].  Patient has 2 siblings with [place]'s.  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 Lives by herself, single, no children. Niece in [place] is closest family member - symptoms of [place].  Patient has 2 siblings with [place]'s.  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     Patient has a past medical history of bipolar disorder, depression, GERD, osteoarthritis, syringomyelia (per outside records, asymptomatic currently), prediabetes (denies T2DM or A1c over 6.4), psoriasis, vitamin D deficiency and elevated blood pressures.    Patient is here for annual Medicare visit.  She is overall doing well.  She states that she has been eating more pasta, because meats have gotten quite expensive.  She has gained some weight since last visit..    HEALTH RISK ASSESSMENT    1945    Recent Hospitalizations:  No hospitalization(s) within the last year..        Current Medical Providers:  Patient Care Team:  Pietro Barber MD as PCP - General (Family Medicine)        Smoking Status:  Social History     Tobacco Use   Smoking Status Every Day   • Packs/day: 0.25   • Years: 59.00   • Pack years: 14.75   • Types: Cigarettes   Smokeless Tobacco Never       Alcohol Consumption:  Social History     Substance and Sexual Activity   Alcohol Use Yes    Comment: 2-3 times a year       Depression Screen:   PHQ-2/PHQ-9 Depression Screening 10/21/2022   Retired PHQ-9 Total Score -   Retired Total Score -   Little Interest or Pleasure in Doing Things 0-->not at all   Feeling Down, Depressed or Hopeless 0-->not at all   PHQ-9: Brief Depression Severity Measure Score 0       Health Habits and Functional and Cognitive Screening:  Functional  & Cognitive Status 10/21/2022   Do you have difficulty preparing food and eating? No   Do you have difficulty bathing yourself, getting dressed or grooming yourself? No   Do you have difficulty using the toilet? No   Do you have difficulty moving around from place to place? No   Do you have trouble with steps or getting out of a bed or a chair? No   Current Diet Unhealthy Diet   Dental Exam Not up to date   Eye Exam Not up to date   Exercise (times per week) 3 times per week   Current Exercises Include Walking   Do you need help using the phone?  No   Are you deaf or do you have serious difficulty hearing?  Yes   Do you need help with transportation? No   Do you need help shopping? No   Do you need help preparing meals?  No   Do you need help with housework?  No   Do you need help with laundry? No   Do you need help taking your medications? No   Do you need help managing money? No   Do you ever drive or ride in a car without wearing a seat belt? No   Have you felt unusual stress, anger or loneliness in the last month? No   Who do you live with? Alone   If you need help, do you have trouble finding someone available to you? No   Have you been bothered in the last four weeks by sexual problems? No   Do you have difficulty concentrating, remembering or making decisions? No       Fall Risk Screen:  STEADI Fall Risk Assessment was completed, and patient is at MODERATE risk for falls. Assessment completed on:10/21/2022    ACE III MINI        Does the patient have evidence of cognitive impairment? No    Aspirin use counseling: Does not need ASA (and currently is not on it)    Recent Lab Results:  CMP:  Lab Results   Component Value Date    BUN 10 06/17/2021    CREATININE 0.67 06/17/2021    EGFRIFNONA 86 06/17/2021    BCR 14.9 06/17/2021     06/17/2021    K 4.2 06/17/2021    CO2 28.5 06/17/2021    CALCIUM 9.0 06/17/2021    ALBUMIN 4.10 06/17/2021    BILITOT 0.2 06/17/2021    ALKPHOS 96 06/17/2021    AST 14 06/17/2021     ALT 14 06/17/2021     HbA1c:  Lab Results   Component Value Date    HGBA1C 5.5 03/11/2022    HGBA1C 6.2 (A) 10/13/2021     Microalbumin:  No results found for: MICROALBUR, POCMALB, POCCREAT  Lipid Panel  Lab Results   Component Value Date    CHOL 166 06/17/2021    TRIG 118 06/17/2021    HDL 70 (H) 06/17/2021    LDL 75 06/17/2021    AST 14 06/17/2021    ALT 14 06/17/2021       Visual Acuity:  No results found.    Age-appropriate Screening Schedule:  Refer to the list below for future screening recommendations based on patient's age, sex and/or medical conditions. Orders for these recommended tests are listed in the plan section. The patient has been provided with a written plan.    Health Maintenance   Topic Date Due   • DXA SCAN  Never done   • ZOSTER VACCINE (2 of 3) 06/06/2012   • TDAP/TD VACCINES (2 - Td or Tdap) 03/29/2022   • LIPID PANEL  06/17/2022   • INFLUENZA VACCINE  Completed        Subjective   History of Present Illness    Cierra Alvarez is a 76 y.o. female who presents for a Subsequent Wellness Visit.    CHRONIC CONDITIONS    The following portions of the patient's history were reviewed and updated as appropriate: allergies, current medications, past family history, past medical history, past social history, past surgical history and problem list.    Outpatient Medications Prior to Visit   Medication Sig Dispense Refill   • amitriptyline (ELAVIL) 10 MG tablet Take 2-3 tablets by mouth At Night As Needed for Sleep. 270 tablet 1   • ascorbic acid (VITAMIN C) 1000 MG tablet Vitamin C 1,000 mg tablet   Take 1 tablet every day by oral route.     • Cholecalciferol (vitamin D3) 125 MCG (5000 UT) tablet tablet Take 5,000 Units by mouth Daily.     • FLUoxetine (PROzac) 10 MG capsule TAKE 1 CAPSULE BY MOUTH EVERY DAY 90 capsule 1   • gabapentin (NEURONTIN) 600 MG tablet Take 1 tablet by mouth 4 (Four) Times a Day. 2 in am , 2 at 1 , 2 at dinner , 3 at bedtime     • lithium carbonate 150 MG capsule Take 3  "capsules by mouth Daily. 3 capsules once a day 270 capsule 1   • pantoprazole (PROTONIX) 40 MG EC tablet TAKE 1 TABLET BY MOUTH EVERY DAY 30 tablet 1   • lithium carbonate 150 MG capsule Take 1 capsule by mouth 3 (Three) Times a Day With Meals. (Patient taking differently: Take 3 capsules by mouth Daily. 3 capsules once a day) 270 capsule 0   • Diclofenac Sodium (VOLTAREN) 1 % gel gel Apply  topically to the appropriate area as directed 4 (Four) Times a Day As Needed.       No facility-administered medications prior to visit.       Patient Active Problem List   Diagnosis   • Prediabetes   • Bipolar disorder (HCC)   • Gastroesophageal reflux disease   • Psoriasis   • Cigarette smoker   • Borderline hypertension   • Syringomyelia (HCC)   • Primary osteoarthritis of left knee   • Effusion of left knee   • Overweight (BMI 25.0-29.9)       Advance Care Planning:  ACP discussion was held with the patient during this visit. Patient does not have an advance directive, information provided.    Identification of Risk Factors:  Risk factors include: Advance Directive Discussion  Obesity/Overweight .    Review of Systems    Compared to one year ago, the patient feels her physical health is better.  Compared to one year ago, the patient feels her mental health is the same.    Objective     Physical Exam     Procedures     Vitals:    10/21/22 1017 10/21/22 1032 10/21/22 1117   BP: 152/98 150/80 136/84   BP Location: Left arm Left arm Left arm   Patient Position: Sitting Sitting Sitting   Cuff Size: Adult Adult Adult   Pulse: 68     Temp: 97.7 °F (36.5 °C)     TempSrc: Temporal     Weight: 70 kg (154 lb 6.4 oz)     Height: 161.5 cm (63.58\")     PainSc: 0-No pain         BMI is within normal parameters. No other follow-up for BMI required.      Assessment & Plan     1. Encounter for subsequent annual wellness visit (AWV) in Medicare patient  Patient is due for lipid check, but will defer to later because she is not fasting " today.    2. Bipolar disorder, in partial remission, most recent episode depressed (HCC)  Overall stable and doing fairly well.  We will continue on current medications.  We will check CMP and lithium level.  Her last dose was last night.  - Comprehensive metabolic panel; Future  - Lithium level; Future    3. Prediabetes  Hemoglobin A1C   Date Value Ref Range Status   03/11/2022 5.5 % Final   10/13/2021 6.2 (A) 4.8 - 5.6 % Final   05/24/2021 5.7 % Final   Patient was asking about glucometer to check blood sugar, however based on her levels he does not really need to monitor.  We will check her level again now.  - Hemoglobin A1c; Future    4. Borderline hypertension  BP Readings from Last 3 Encounters:   10/21/22 136/84   03/11/22 136/80   12/03/21 128/82   Blood pressures continued to be borderline.  We will continue to monitor.    5. Cigarette smoker  Counseled on cessation, patient not ready to quit yet    6. Overweight (BMI 25.0-29.9)        Patient Self-Management and Personalized Health Advice  The patient has been provided with information about: diet, exercise and weight management and preventive services including:   · Annual Wellness Visit (AWV).    Outpatient Encounter Medications as of 10/21/2022   Medication Sig Dispense Refill   • amitriptyline (ELAVIL) 10 MG tablet Take 2-3 tablets by mouth At Night As Needed for Sleep. 270 tablet 1   • ascorbic acid (VITAMIN C) 1000 MG tablet Vitamin C 1,000 mg tablet   Take 1 tablet every day by oral route.     • Cholecalciferol (vitamin D3) 125 MCG (5000 UT) tablet tablet Take 5,000 Units by mouth Daily.     • FLUoxetine (PROzac) 10 MG capsule TAKE 1 CAPSULE BY MOUTH EVERY DAY 90 capsule 1   • gabapentin (NEURONTIN) 600 MG tablet Take 1 tablet by mouth 4 (Four) Times a Day. 2 in am , 2 at 1 , 2 at dinner , 3 at bedtime     • lithium carbonate 150 MG capsule Take 3 capsules by mouth Daily. 3 capsules once a day 270 capsule 1   • pantoprazole (PROTONIX) 40 MG EC  tablet TAKE 1 TABLET BY MOUTH EVERY DAY 30 tablet 1   • [DISCONTINUED] lithium carbonate 150 MG capsule Take 1 capsule by mouth 3 (Three) Times a Day With Meals. (Patient taking differently: Take 3 capsules by mouth Daily. 3 capsules once a day) 270 capsule 0   • [DISCONTINUED] Diclofenac Sodium (VOLTAREN) 1 % gel gel Apply  topically to the appropriate area as directed 4 (Four) Times a Day As Needed.       No facility-administered encounter medications on file as of 10/21/2022.       Reviewed use of high risk medication in the elderly: yes  Reviewed for potential of harmful drug interactions in the elderly: yes    Follow Up:  Return in about 4 months (around 2/21/2023).     There are no Patient Instructions on file for this visit.    An After Visit Summary and PPPS with all of these plans were given to the patient.      Pietro Barber MD\

## 2022-10-22 LAB
ALBUMIN SERPL-MCNC: 4.5 G/DL (ref 3.5–5.2)
ALBUMIN/GLOB SERPL: 1.7 G/DL
ALP SERPL-CCNC: 72 U/L (ref 39–117)
ALT SERPL W P-5'-P-CCNC: 12 U/L (ref 1–33)
ANION GAP SERPL CALCULATED.3IONS-SCNC: 10.5 MMOL/L (ref 5–15)
AST SERPL-CCNC: 17 U/L (ref 1–32)
BILIRUB SERPL-MCNC: 0.3 MG/DL (ref 0–1.2)
BUN SERPL-MCNC: 14 MG/DL (ref 8–23)
BUN/CREAT SERPL: 16.1 (ref 7–25)
CALCIUM SPEC-SCNC: 10.5 MG/DL (ref 8.6–10.5)
CHLORIDE SERPL-SCNC: 99 MMOL/L (ref 98–107)
CO2 SERPL-SCNC: 26.5 MMOL/L (ref 22–29)
CREAT SERPL-MCNC: 0.87 MG/DL (ref 0.57–1)
EGFRCR SERPLBLD CKD-EPI 2021: 69.1 ML/MIN/1.73
GLOBULIN UR ELPH-MCNC: 2.6 GM/DL
GLUCOSE SERPL-MCNC: 104 MG/DL (ref 65–99)
HBA1C MFR BLD: 5.6 % (ref 4.8–5.6)
LITHIUM SERPL-SCNC: 0.6 MMOL/L (ref 0.6–1.2)
POTASSIUM SERPL-SCNC: 4.4 MMOL/L (ref 3.5–5.2)
PROT SERPL-MCNC: 7.1 G/DL (ref 6–8.5)
SODIUM SERPL-SCNC: 136 MMOL/L (ref 136–145)

## 2022-11-28 RX ORDER — PANTOPRAZOLE SODIUM 40 MG/1
TABLET, DELAYED RELEASE ORAL
Qty: 30 TABLET | Refills: 1 | Status: SHIPPED | OUTPATIENT
Start: 2022-11-28 | End: 2022-12-27

## 2022-11-30 DIAGNOSIS — F31.75 BIPOLAR DISORDER, IN PARTIAL REMISSION, MOST RECENT EPISODE DEPRESSED: Chronic | ICD-10-CM

## 2022-11-30 RX ORDER — AMITRIPTYLINE HYDROCHLORIDE 10 MG/1
20-30 TABLET, FILM COATED ORAL NIGHTLY PRN
Qty: 270 TABLET | Refills: 1 | Status: SHIPPED | OUTPATIENT
Start: 2022-11-30 | End: 2023-02-24 | Stop reason: SDUPTHER

## 2022-11-30 NOTE — TELEPHONE ENCOUNTER
Rx Refill Note  Requested Prescriptions     Pending Prescriptions Disp Refills   • amitriptyline (ELAVIL) 10 MG tablet [Pharmacy Med Name: AMITRIPTYLINE HCL 10 MG TAB] 270 tablet 1     Sig: TAKE 2-3 TABLETS BY MOUTH AT NIGHT AS NEEDED FOR SLEEP.      Last office visit with prescribing clinician: Visit date not found   Last telemedicine visit with prescribing clinician: 2/21/2023   Next office visit with prescribing clinician: Visit date not found                         Would you like a call back once the refill request has been completed: [] Yes [] No    If the office needs to give you a call back, can they leave a voicemail: [] Yes [] No    Estelita Harry LPN  11/30/22, 12:46 EST

## 2022-12-27 RX ORDER — PANTOPRAZOLE SODIUM 40 MG/1
TABLET, DELAYED RELEASE ORAL
Qty: 30 TABLET | Refills: 1 | Status: SHIPPED | OUTPATIENT
Start: 2022-12-27 | End: 2023-03-13

## 2022-12-30 ENCOUNTER — TELEPHONE (OUTPATIENT)
Dept: INTERNAL MEDICINE | Facility: CLINIC | Age: 77
End: 2022-12-30
Payer: MEDICARE

## 2022-12-30 RX ORDER — ALBUTEROL SULFATE 90 UG/1
2 AEROSOL, METERED RESPIRATORY (INHALATION) EVERY 4 HOURS PRN
Qty: 18 G | Refills: 5 | Status: SHIPPED | OUTPATIENT
Start: 2022-12-30

## 2022-12-30 NOTE — TELEPHONE ENCOUNTER
Patient called stating that previously she had the flu \"really bad for almost 30 days\" with a recurrent bronchitis following it, she stopped smoking and it seemed as though the problem cleared up    She states she felt better and started smoking again around 12/20/2022 and the bronchitis is back and seems to be worse than before    \"A couple of days ago\" she started to not feel right, she is an avid walker and she has been experiencing weakness and feeling winded while walking.    She was recommended to go to the ER by the hub but does not believe this is necessary and states she can wait to be seen until she can have an appointment.     Appointment scheduled with Dr Barber 01/04/2023    She believes her o2 is low and that there may be some form of respiratory infection, she has been wheezing and has a non productive cough unless she drinks coffee. A pharmacist suggested she use otc mucinex but she has not.    She wants to know if she needs a bronchodilator or anything else prescribed to make it until her appointment along with any recommended instructions, please advise

## 2023-01-30 RX ORDER — LITHIUM CARBONATE 150 MG/1
CAPSULE ORAL
Qty: 270 CAPSULE | Refills: 1 | OUTPATIENT
Start: 2023-01-30

## 2023-01-30 NOTE — TELEPHONE ENCOUNTER
Caller: Cierra Alvarez    Relationship: Self    Best call back number: [phone]    Requested Prescriptions:   Requested Prescriptions     Pending Prescriptions Disp Refills   • lithium carbonate 150 MG capsule 270 capsule 1     Sig: Take 3 capsules by mouth Daily. 3 capsules once a day        [facility] sent[id number] [place], [place] - [address] [phone] [facility] [phone]      Additional details provided by patient: PATIENT HAS 22 DAYS AND SAYS THE PHARMACY HAS NO MORE REFILLS. THIS WILL LEAVE HER SHY A FEW DAYS BEFORE HER APPOINTMENT.    Does the patient have less than a 3 day supply:  [] Yes  [x] No    Would you like a call back once the refill request has been completed: [] Yes [x] No    If the office needs to give you a call back, can they leave a voicemail: [] Yes [x] No    Lux Patrick Rep   01/30/23 11:26 EST

## 2023-01-31 RX ORDER — LITHIUM CARBONATE 150 MG/1
450 CAPSULE ORAL DAILY
Qty: 270 CAPSULE | Refills: 1 | Status: SHIPPED | OUTPATIENT
Start: 2023-01-31

## 2023-01-31 NOTE — TELEPHONE ENCOUNTER
Rx Refill Note  Requested Prescriptions     Pending Prescriptions Disp Refills   • lithium carbonate 150 MG capsule 270 capsule 1     Sig: Take 3 capsules by mouth Daily. 3 capsules once a day      Last office visit with prescribing clinician: 10/21/2022   Last telemedicine visit with prescribing clinician: 2/21/2023   Next office visit with prescribing clinician: 2/21/2023                           Eli Lujan RN  01/31/23, 09:58 EST

## 2023-02-10 ENCOUNTER — TELEPHONE (OUTPATIENT)
Dept: ORTHOPEDIC SURGERY | Facility: CLINIC | Age: 78
End: 2023-02-10

## 2023-02-10 NOTE — TELEPHONE ENCOUNTER
Caller: JESUS HARRY    Relationship to patient: PATIENT    Best call back number:  [phone]    Chief complaint:  PATIENT REQUESTING LEFT KNEE INJECTION ASAP. HAVING TROUBLE WALKING.    Type of visit:  F/U/INJ

## 2023-02-16 ENCOUNTER — OFFICE VISIT (OUTPATIENT)
Dept: ORTHOPEDIC SURGERY | Facility: CLINIC | Age: 78
End: 2023-02-16
Payer: MEDICARE

## 2023-02-16 VITALS
SYSTOLIC BLOOD PRESSURE: 146 MMHG | HEIGHT: 64 IN | WEIGHT: 152.2 LBS | BODY MASS INDEX: 25.99 KG/M2 | DIASTOLIC BLOOD PRESSURE: 86 MMHG

## 2023-02-16 DIAGNOSIS — M17.12 PRIMARY OSTEOARTHRITIS OF LEFT KNEE: Primary | ICD-10-CM

## 2023-02-16 DIAGNOSIS — M25.562 LEFT KNEE PAIN, UNSPECIFIED CHRONICITY: ICD-10-CM

## 2023-02-16 DIAGNOSIS — Z72.0 TOBACCO ABUSE: ICD-10-CM

## 2023-02-16 PROCEDURE — 20610 DRAIN/INJ JOINT/BURSA W/O US: CPT | Performed by: PHYSICIAN ASSISTANT

## 2023-02-16 PROCEDURE — 99214 OFFICE O/P EST MOD 30 MIN: CPT | Performed by: PHYSICIAN ASSISTANT

## 2023-02-16 RX ADMIN — TRIAMCINOLONE ACETONIDE 40 MG: 40 INJECTION, SUSPENSION INTRA-ARTICULAR; INTRAMUSCULAR at 13:28

## 2023-02-16 RX ADMIN — LIDOCAINE HYDROCHLORIDE 4 ML: 10 INJECTION, SOLUTION EPIDURAL; INFILTRATION; INTRACAUDAL; PERINEURAL at 13:28

## 2023-02-16 NOTE — PROGRESS NOTES
"    [facility] Note        Subjective     CC: Follow-up (8 month follow up -- Primary osteoarthritis of left knee )      FADIA Alvarez is a 77 y.o. female.  Patient (new to me) returns today requesting repeat corticosteroid injection left knee.  Last injected 1622 by Dr. Newton.  Patient reports the injection worked well until about 1 week ago.  No injury or trauma.    Pain scale: 5/10.  Associated symptoms swelling, popping, stiffness.  Worse with walking, stair climbing, lying on affected side, rising from a seated position, movement and knee.  Voltaren, lying down and elevating do help.  Patient has been doing home knee exercises.  No numbness or tingling into the extremity.    Overall, patient's symptoms are worsening.    ROS:    Constiutional:Pt denies fever, chills, nausea, or vomiting.  MSK:as above        Objective      Past Medical History  Past Medical History:   Diagnosis Date   • Depression    • Heartburn    • Hypertension    • Primary osteoarthritis of left knee 9/19/2016     Social History     Socioeconomic History   • Marital status: Single   Tobacco Use   • Smoking status: Every Day     Packs/day: 0.25     Years: 59.00     Pack years: 14.75     Types: Cigarettes   • Smokeless tobacco: Never   Vaping Use   • Vaping Use: Never used   Substance and Sexual Activity   • Alcohol use: Yes     Comment: 2-3 times a year   • Drug use: Never   • Sexual activity: Defer          Physical Exam  /86   Ht 161.5 cm (63.58\")   Wt 69 kg (152 lb 3.2 oz)   BMI 26.47 kg/m²     Body mass index is 26.47 kg/m².    Patient is well nourished and well developed.        Ortho Exam  Left knee  Alignment: Genu varum  Skin: Intact without any erythema, warmth, swelling or evidence of infection.  Motion: 5-120° with crepitus.  Tenderness: Positive medial joint line as well as chaz-/retropatellar.  Instability: ACL/PCL/MCL/LCL stable and intact on exam.  Meniscus: Remy's pain medial joint " line but no catch or click.    Patella: Compression/grind positive.  Straight leg raise: Intact.  Motor: Grossly intact Q/HS/TA/GS/EHL/P  Sensory: Grossly intact DP/SP/S/S/T nerve distributions.      Imaging/Labs/EMG Reviewed:  No new imaging today.      Assessment:  1. Primary osteoarthritis of left knee    2. Left knee pain, unspecified chronicity    3. Tobacco abuse        Plan:  1. Primary osteoarthritis left knee causing chronic pain, worsening.  2. Patient uninterested in any surgical intervention.  She would be a candidate for TKA however she does smoke and needs to be abstain from smoking minimum of 6 weeks prior to surgery.  3. Offered and accepted corticosteroid injection to left knee.  Injection was given today.  4. Patient to continue with home knee exercise program.  [facility]  5. Recommend OTC pain medication as needed.  6. Tobacco abuse--discussed the importance of smoking cessation, reviewed the adverse effects of tobacco use: increased risk of tendonitis (more difficult to treat and resolve), hardening of the arteries, gangrene, amputation, etc. Included in the counseling were treatments options for cessation: quitting cold turkey, medication, nicotine step-down programs and smoking cessation programs. Furthermore, I explained to the patient the importance of abstaining from nicotine usage as nicotine is known to increase risk of complications associated with surgery including but not limited to infection, decreased wound healing, fracture/fusion nonunion, slowed soft tissue healing, and increased surgery associated pain. (5 minutes spent counseling patient)  7. Follow up in 4 months for repeat evaluation/injection.  Recommend repeat imaging at next appointment to reassess progression of arthritis.  8. Questions and concerns answered.    After discussing the risks, benefits, indications of injection, the patient gave consent to proceed.  Her left knee was  confirmed as the correct joint to be injected with a timeout.  It was then prepped using Hibiclens and injected with a mixture of 4 cc of 1% plain lidocaine and 1 cc of Kenalog (40 mg per mL), without any resistance through the anterior lateral approach, patient in seated position.  Area was cleaned, hemostasis was achieved and a Band-Aid was applied over the injection site.  The patient tolerated procedure well.  I instructed the patient on signs and symptoms of infection.  They should report to the emergency department or return to clinic if any of these develop, for further evaluation and treatment.  Recommended modifying activity for the next 48 hours to include rest, ice, elevation and oral pain medication as needed.        Yue Burt PA-C  02/21/23  08:28 EST      Dictated Utilizing Dragon Dictation.

## 2023-02-16 NOTE — PROGRESS NOTES
Procedure   Large Joint Arthrocentesis: L knee  Date/Time: 2/16/2023 1:28 PM  Consent given by: patient  Site marked: site marked  Timeout: Immediately prior to procedure a time out was called to verify the correct patient, procedure, equipment, support staff and site/side marked as required   Supporting Documentation  Indications: pain   Procedure Details  Location: knee - L knee  Preparation: Patient was prepped and draped in the usual sterile fashion  Needle size: 23 G  Approach: anterolateral  Medications administered: 4 mL lidocaine PF 1% 1 %; 40 mg triamcinolone acetonide 40 MG/ML  Patient tolerance: patient tolerated the procedure well with no immediate complications

## 2023-02-21 RX ORDER — LIDOCAINE HYDROCHLORIDE 10 MG/ML
4 INJECTION, SOLUTION EPIDURAL; INFILTRATION; INTRACAUDAL; PERINEURAL
Status: COMPLETED | OUTPATIENT
Start: 2023-02-16 | End: 2023-02-16

## 2023-02-21 RX ORDER — TRIAMCINOLONE ACETONIDE 40 MG/ML
40 INJECTION, SUSPENSION INTRA-ARTICULAR; INTRAMUSCULAR
Status: COMPLETED | OUTPATIENT
Start: 2023-02-16 | End: 2023-02-16

## 2023-02-24 DIAGNOSIS — F31.75 BIPOLAR DISORDER, IN PARTIAL REMISSION, MOST RECENT EPISODE DEPRESSED: Chronic | ICD-10-CM

## 2023-02-24 RX ORDER — AMITRIPTYLINE HYDROCHLORIDE 10 MG/1
20-30 TABLET, FILM COATED ORAL NIGHTLY PRN
Qty: 270 TABLET | Refills: 1 | Status: SHIPPED | OUTPATIENT
Start: 2023-02-24 | End: 2023-03-27

## 2023-02-24 NOTE — TELEPHONE ENCOUNTER
Rx Refill Note  Requested Prescriptions     Pending Prescriptions Disp Refills   • amitriptyline (ELAVIL) 10 MG tablet 270 tablet 1     Sig: Take 2-3 tablets by mouth At Night As Needed for Sleep.      Last office visit with prescribing clinician: 10/21/2022   Next office visit with prescribing clinician: 3/7/2023                         Would you like a call back once the refill request has been completed: [] Yes [] No    If the office needs to give you a call back, can they leave a voicemail: [] Yes [] No    Mariam White LPN  02/24/23, 10:26 EST

## 2023-03-13 RX ORDER — PANTOPRAZOLE SODIUM 40 MG/1
TABLET, DELAYED RELEASE ORAL
Qty: 30 TABLET | Refills: 5 | Status: SHIPPED | OUTPATIENT
Start: 2023-03-13

## 2023-03-25 DIAGNOSIS — F31.75 BIPOLAR DISORDER, IN PARTIAL REMISSION, MOST RECENT EPISODE DEPRESSED: Chronic | ICD-10-CM

## 2023-03-27 RX ORDER — AMITRIPTYLINE HYDROCHLORIDE 10 MG/1
20-30 TABLET, FILM COATED ORAL NIGHTLY PRN
Qty: 270 TABLET | Refills: 1 | Status: SHIPPED | OUTPATIENT
Start: 2023-03-27

## 2023-03-27 NOTE — TELEPHONE ENCOUNTER
Rx Refill Note  Requested Prescriptions     Pending Prescriptions Disp Refills   • amitriptyline (ELAVIL) 10 MG tablet [Pharmacy Med Name: AMITRIPTYLINE HCL 10 MG TAB] 270 tablet 1     Sig: TAKE 2-3 TABLETS BY MOUTH AT NIGHT AS NEEDED FOR SLEEP.      Last office visit with prescribing clinician: 10/21/2022   Last telemedicine visit with prescribing clinician: Visit date not found   Next office visit with prescribing clinician: Visit date not found                         Would you like a call back once the refill request has been completed: [] Yes [] No    If the office needs to give you a call back, can they leave a voicemail: [] Yes [] No    Estelita Harry LPN  03/27/23, 07:39 EDT

## 2023-08-11 ENCOUNTER — TELEPHONE (OUTPATIENT)
Dept: INTERNAL MEDICINE | Facility: CLINIC | Age: 78
End: 2023-08-11
Payer: MEDICARE

## 2023-08-11 RX ORDER — LITHIUM CARBONATE 150 MG/1
CAPSULE ORAL
Qty: 270 CAPSULE | Refills: 0 | Status: SHIPPED | OUTPATIENT
Start: 2023-08-11

## 2023-08-11 NOTE — TELEPHONE ENCOUNTER
Rx Refill Note  Requested Prescriptions     Pending Prescriptions Disp Refills    lithium carbonate 150 MG capsule [Pharmacy Med Name: LITHIUM CARBONATE 150 MG CAP] 270 capsule 1     Sig: TAKE 3 CAPSULES BY MOUTH ONCE A DAY      Last office visit with prescribing clinician: 10/21/2022   Last telemedicine visit with prescribing clinician: Visit date not found   Next office visit with prescribing clinician: Visit date not found                         Would you like a call back once the refill request has been completed: [] Yes [] No    If the office needs to give you a call back, can they leave a voicemail: [] Yes [] No    Estelita Harry LPN  08/11/23, 08:57 EDT

## 2023-08-11 NOTE — TELEPHONE ENCOUNTER
Please let patient know that she is overdue for follow-up visit with me and should schedule within a few weeks.  I have refilled only for 90 days on her medication

## 2023-08-14 ENCOUNTER — TELEPHONE (OUTPATIENT)
Dept: INTERNAL MEDICINE | Facility: CLINIC | Age: 78
End: 2023-08-14

## 2023-08-14 NOTE — TELEPHONE ENCOUNTER
Caller: Cierra Alvarez    Relationship: Self    Best call back number: [phone]    What is the best time to reach you: ANYTIME     Who are you requesting to speak with (clinical staff, provider,  specific staff member): CLINICAL STAFF    What was the call regarding: PATIENT IS CALLING TO SPEAK WITH THE CLINICAL STAFF. SHE SAYS THAT SHE HAS HURT HER GOOD KNEE. SHE SAYS THAT SHE IS IN A LOT OF PAIN AND IS HAVING TROUBLE MOVING AROUND. SHE HAS NOT GONE TO THE ER AND IS THINKING OF DOING SO. SHE IS WANTING TO SEE IF THIS IS WHAT SHE CAN DO OR IF SHE NEEDS TO COME IN TO THE OFFICE OR GO TO AN ORTHOPEDIC SPECIALIST.     Is it okay if the provider responds through [employer]: YES

## 2023-08-14 NOTE — TELEPHONE ENCOUNTER
Please update patient's phone number, see phone note from earlier today were staff could not reach her.    I can not refer her to Ortho without evaluating her.  Since she hurt her knee she should be seen to rule out fracture.  She is also due for a visit with me.

## 2023-08-14 NOTE — TELEPHONE ENCOUNTER
Yes please, and just make a note for her to call back and cancel or reschedule if the appointment does work with her schedule.  Thanks

## 2023-08-14 NOTE — TELEPHONE ENCOUNTER
I HAVE TRIED CALLING THE PT, BUT THE NUMBER ON FILE IS NOT A WORKING NUMBER.  DO YOU WANT ME TO GO ON AND SCHEDULE HER AN APPT AND MAIL IT TO HER?

## 2023-08-15 ENCOUNTER — TELEPHONE (OUTPATIENT)
Dept: INTERNAL MEDICINE | Facility: CLINIC | Age: 78
End: 2023-08-15
Payer: MEDICARE

## 2023-08-15 DIAGNOSIS — M25.569 KNEE PAIN, UNSPECIFIED CHRONICITY, UNSPECIFIED LATERALITY: Primary | ICD-10-CM

## 2023-08-15 DIAGNOSIS — F31.75 BIPOLAR DISORDER, IN PARTIAL REMISSION, MOST RECENT EPISODE DEPRESSED: Chronic | ICD-10-CM

## 2023-08-15 NOTE — TELEPHONE ENCOUNTER
I left voicemail on number provided which is Kathy Meade's phone asking her to have patient call me back. I just left my name and number

## 2023-08-15 NOTE — TELEPHONE ENCOUNTER
I spoke to patient she stated she doesn't think she needs a referral she has seen Dr. Loaiza before. She is going to give them a call at [phone] to see if she can get in to see him and will give us a call back.

## 2023-08-15 NOTE — TELEPHONE ENCOUNTER
Caller: Cierra Alvarez    Relationship: Self    Best call back number:[phone]     Requested Prescriptions:   Requested Prescriptions     Pending Prescriptions Disp Refills    amitriptyline (ELAVIL) 10 MG tablet 270 tablet 0     Sig: Take 2-3 tablets by mouth At Night As Needed for Sleep.        Pharm[facility] where request [facility] be sent: [id number] [place], [place] - [address] [address] [facility] [phone] [facility] [phone]      Last office visit with prescribing clinician: 10/21/2022   Last telemedicine visit with prescribing clinician: Visit date not found   Next office visit with prescribing clinician: 8/30/2023     Additional details provided by patient: PATIENT IS REQUESTING TO BE PRESCRIBED VOLTAREN CREAM.    Does the patient have less than a 3 day supply:  [x] Yes  [] No    Would you like a call back once the refill request has been completed: [] Yes [x] No    If the office needs to give you a call back, can they leave a voicemail: [] Yes [x] No    Lux Villagomez Rep   08/15/23 15:53 EDT

## 2023-08-16 RX ORDER — AMITRIPTYLINE HYDROCHLORIDE 10 MG/1
20-30 TABLET, FILM COATED ORAL NIGHTLY PRN
Qty: 270 TABLET | Refills: 0 | Status: SHIPPED | OUTPATIENT
Start: 2023-08-16

## 2023-08-21 ENCOUNTER — OFFICE VISIT (OUTPATIENT)
Dept: ORTHOPEDIC SURGERY | Facility: CLINIC | Age: 78
End: 2023-08-21
Payer: MEDICARE

## 2023-08-21 VITALS
DIASTOLIC BLOOD PRESSURE: 90 MMHG | SYSTOLIC BLOOD PRESSURE: 152 MMHG | HEIGHT: 64 IN | WEIGHT: 148 LBS | BODY MASS INDEX: 25.27 KG/M2

## 2023-08-21 DIAGNOSIS — Z72.0 TOBACCO ABUSE: ICD-10-CM
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
## 2023-08-21 DIAGNOSIS — M17.11 PRIMARY OSTEOARTHRITIS OF RIGHT KNEE: ICD-10-CM

## 2023-08-21 DIAGNOSIS — M25.561 RIGHT KNEE PAIN, UNSPECIFIED CHRONICITY: Primary | ICD-10-CM

## 2023-08-21 RX ADMIN — TRIAMCINOLONE ACETONIDE 40 MG: 40 INJECTION, SUSPENSION INTRA-ARTICULAR; INTRAMUSCULAR at 11:30

## 2023-08-21 RX ADMIN — LIDOCAINE HYDROCHLORIDE 4 ML: 10 INJECTION, SOLUTION EPIDURAL; INFILTRATION; INTRACAUDAL; PERINEURAL at 11:30

## 2023-08-21 NOTE — PROGRESS NOTES
Procedure   - Large Joint Arthrocentesis: R knee on 8/21/2023 11:30 AM  Indications: pain  Details: 21 G needle, anterolateral approach  Medications: 4 mL lidocaine PF 1% 1 %; 40 mg triamcinolone acetonide 40 MG/ML  Outcome: tolerated well, no immediate complications  Procedure, treatment alternatives, risks and benefits explained, specific risks discussed. Consent was given by the patient. Immediately prior to procedure a time out was called to verify the correct patient, procedure, equipment, support staff and site/side marked as required. Patient was prepped and draped in the usual sterile fashion.

## 2023-08-21 NOTE — PROGRESS NOTES
"    [facility] [place] [facility] [place]        Subjective     CC: Pain of the Right Knee      HPI    Cierra Alvarez is a 77 y.o. female. Established patient presents for new problem--right knee pain. Symptoms/pain have been ongoing for 3 weeks.  KRISTINA: History of a fall (was walking to get cigarettes).    Pain scale: 6-7/10.  Severity of the pain moderate.  Quality of the pain throbbing, stabbing.  Associated symptoms swelling, stiffness.  Activity related to pain walking, standing.  Pain eased by resting, ice, medication, elevating.  No reported numbness or tingling.  Prior treatments Voltaren, Tylenol.    No reported mechanical symptoms, such as locking or catching.     Overall, patient's symptoms are as above, new issue. Patient continues to smoke.    ROS:    Constiutional:Pt denies fever, chills, nausea, or vomiting.  MSK:as above        Objective      Past Medical History  Past Medical History:   Diagnosis Date    Depression     Heartburn     Hypertension     Primary osteoarthritis of left knee 9/19/2016     Social History     Socioeconomic History    Marital status: Single   Tobacco Use    Smoking status: Every Day     Packs/day: 0.25     Years: 59.00     Pack years: 14.75     Types: Cigarettes    Smokeless tobacco: Never   Vaping Use    Vaping Use: Never used   Substance and Sexual Activity    Alcohol use: Yes     Comment: 2-3 times a year    Drug use: Never    Sexual activity: Defer          Physical Exam  /90   Ht 161.3 cm (63.5\")   Wt 67.1 kg (148 lb)   BMI 25.81 kg/mý     Body mass index is 25.81 kg/mý.    Patient is well nourished and well developed.        Ortho Exam  Right Knee  Alignment: Neutral  Skin: Intact without any erythema, warmth, swelling or evidence of infection.  Motion: 0-120ø with mild crepitus.  Tenderness: Joint lines positive medial > lateral.    Instability: ACL/PCL/MCL/LCL stable and intact on exam.  Meniscus: Remy's mild pain medial > lateral without catch or " click..  Patella: Compression/grind positive.  Straight leg raise: Intact.  Motor: Grossly intact Q/HS/TA/GS/EHL/P  Sensory: Grossly intact light touch DP/SP/S/S/T nerve distributions.       Imaging/Labs/EMG Reviewed:  Ordered right knee plain films.  Imaging read/interpreted by Dr. Ramirez.    Knee X-Ray  Indication: Pain     Upright AP of bilateral knees. Lateral, skiers and Sunrise views of right knee     Findings:  No fracture  Joint space narrowing and small osteophytes  No prior studies were available for comparison.      Assessment:  1. Right knee pain, unspecified chronicity    2. Primary osteoarthritis of right knee    3. Accidental fall, initial encounter    4. Tobacco abuse        Plan:  Right knee pain due to arthritic flare from fall (new issue).  Reviewed imaging with the patient--no evidence of fracture.  Offered and accepted corticosteroid injection.  Injection was given today.  Can continue to use Voltaren gel prescribed by PCP and Tylenol.  Offered a knee brace but patient declined. Will contact clinic if she changes her mind.  Recommend OTC NSAIDS/pain medication as needed.  Tobacco abuse--patient has continued to smoke. She is slowly cutting down, working on cessation on her own. Will contact PCP if she wants to use medication to assist with quitting.  Follow up as needed.  Questions and concerns answered.    After discussing the risks, benefits, indications of injection, the patient gave consent to proceed.  Her right knee was confirmed as the correct joint to be injected with a timeout.  It was then prepped using Hibiclens and injected with a mixture of 4 cc of 1% plain lidocaine and 1 cc of Kenalog (40 mg per mL), without any resistance through the anterior lateral approach, patient in seated position.  Area was cleaned, hemostasis was achieved and a Band-Aid was applied over the injection site.  The patient tolerated procedure well.  I instructed the patient on signs and symptoms of infection.   They should report to the emergency department or return to clinic if any of these develop, for further evaluation and treatment.  Recommended modifying activity for the next 48 hours to include rest, ice, elevation and oral pain medication as needed.        Yue Burt PA-C  08/22/23  23:39 EDT      Dictated Utilizing Dragon Dictation.

## 2023-08-27 RX ORDER — TRIAMCINOLONE ACETONIDE 40 MG/ML
40 INJECTION, SUSPENSION INTRA-ARTICULAR; INTRAMUSCULAR
Status: COMPLETED | OUTPATIENT
Start: 2023-08-21 | End: 2023-08-21

## 2023-08-27 RX ORDER — LIDOCAINE HYDROCHLORIDE 10 MG/ML
4 INJECTION, SOLUTION EPIDURAL; INFILTRATION; INTRACAUDAL; PERINEURAL
Status: COMPLETED | OUTPATIENT
Start: 2023-08-21 | End: 2023-08-21

## 2023-08-30 ENCOUNTER — TELEPHONE (OUTPATIENT)
Dept: ORTHOPEDIC SURGERY | Facility: CLINIC | Age: 78
End: 2023-08-30
Payer: MEDICARE

## 2023-08-30 NOTE — TELEPHONE ENCOUNTER
Patient is calling requesting a brace for right knee. The patient stated that the current phone number on their chart is incorrect. When asked for the new number, they could not provide it. They stated they will call back

## 2023-09-01 NOTE — TELEPHONE ENCOUNTER
Attempted to contact number listed on chart, number is incorrect. Patient's mychart is not active. If patient returns call please update correct phone number.

## 2023-09-28 RX ORDER — PANTOPRAZOLE SODIUM 40 MG/1
TABLET, DELAYED RELEASE ORAL
Qty: 90 TABLET | Refills: 1 | Status: SHIPPED | OUTPATIENT
Start: 2023-09-28

## 2023-10-04 DIAGNOSIS — F31.75 BIPOLAR DISORDER, IN PARTIAL REMISSION, MOST RECENT EPISODE DEPRESSED: Chronic | ICD-10-CM

## 2023-10-09 RX ORDER — AMITRIPTYLINE HYDROCHLORIDE 10 MG/1
20-30 TABLET, FILM COATED ORAL NIGHTLY PRN
Qty: 270 TABLET | Refills: 0 | Status: SHIPPED | OUTPATIENT
Start: 2023-10-09

## 2023-10-09 NOTE — TELEPHONE ENCOUNTER
Caller: Cierra Alvarez    Relationship: Self    Best call back number: PATIENT DOES NOT HAVE A WORKING PHONE NUMBER AT THIS TIME.    Requested Prescriptions:   Requested Prescriptions     Pending Prescriptions Disp Refills    amitriptyline (ELAVIL) 10 MG tablet [Pharmacy Med Name: AMITRIPTYLINE HCL 10 MG TAB] 270 tablet 0     Sig: TAKE 2-3 TABLETS BY MOUTH AT NIGHT AS NEEDED FOR SLEEP.        Ph[facility] where [facility] sent[id number] [place] [facility] [address] [facility] [phone] [facility] [phone]      Last office visit with prescribing clinician: 10/21/2022   Last telemedicine visit with prescribing clinician: Visit date not found   Next office visit with prescribing clinician: 10/30/2023     Additional details provided by patient: OUT OF MEDICATION     Does the patient have less than a 3 day supply:  [x] Yes  [] No    Would you like a call back once the refill request has been completed: [] Yes [x] No    If the office needs to give you a call back, can they leave a voicemail: [] Yes [x] No    Lux Nelson Rep   10/09/23 15:13 EDT

## 2023-10-21 DIAGNOSIS — F31.75 BIPOLAR DISORDER, IN PARTIAL REMISSION, MOST RECENT EPISODE DEPRESSED: Chronic | ICD-10-CM

## 2023-10-23 RX ORDER — FLUOXETINE 10 MG/1
CAPSULE ORAL
Qty: 90 CAPSULE | Refills: 1 | Status: SHIPPED | OUTPATIENT
Start: 2023-10-23

## 2023-10-23 NOTE — TELEPHONE ENCOUNTER
Rx Refill Note  Requested Prescriptions     Pending Prescriptions Disp Refills    FLUoxetine (PROzac) 10 MG capsule [Pharmacy Med Name: FLUOXETINE HCL 10 MG CAPSULE] 90 capsule 1     Sig: TAKE 1 CAPSULE BY MOUTH EVERY DAY      Last office visit with prescribing clinician: 10/21/2022   Last telemedicine visit with prescribing clinician: Visit date not found   Next office visit with prescribing clinician: 10/30/2023                         Would you like a call back once the refill request has been completed: [] Yes [] No    If the office needs to give you a call back, can they leave a voicemail: [] Yes [] No    Estelita Harry LPN  10/23/23, 09:13 EDT

## 2023-11-11 RX ORDER — LITHIUM CARBONATE 150 MG/1
CAPSULE ORAL
Qty: 270 CAPSULE | Refills: 0 | OUTPATIENT
Start: 2023-11-11

## 2023-11-20 ENCOUNTER — OFFICE VISIT (OUTPATIENT)
Dept: ORTHOPEDIC SURGERY | Facility: CLINIC | Age: 78
End: 2023-11-20
Payer: MEDICARE

## 2023-11-20 VITALS
DIASTOLIC BLOOD PRESSURE: 84 MMHG | WEIGHT: 148 LBS | BODY MASS INDEX: 25.27 KG/M2 | HEIGHT: 64 IN | SYSTOLIC BLOOD PRESSURE: 142 MMHG

## 2023-11-20 DIAGNOSIS — M25.562 LEFT KNEE PAIN, UNSPECIFIED CHRONICITY: ICD-10-CM

## 2023-11-20 DIAGNOSIS — Z72.0 TOBACCO ABUSE: ICD-10-CM

## 2023-11-20 DIAGNOSIS — M17.12 PRIMARY OSTEOARTHRITIS OF LEFT KNEE: Primary | ICD-10-CM

## 2023-11-20 RX ADMIN — TRIAMCINOLONE ACETONIDE 40 MG: 40 INJECTION, SUSPENSION INTRA-ARTICULAR; INTRAMUSCULAR at 10:59

## 2023-11-20 RX ADMIN — LIDOCAINE HYDROCHLORIDE 4 ML: 10 INJECTION, SOLUTION EPIDURAL; INFILTRATION; INTRACAUDAL; PERINEURAL at 10:59

## 2023-11-20 NOTE — PROGRESS NOTES
Procedure   - Large Joint Arthrocentesis: L knee on 11/20/2023 10:59 AM  Indications: pain  Details: 21 G needle, anterolateral approach  Medications: 4 mL lidocaine PF 1% 1 %; 40 mg triamcinolone acetonide 40 MG/ML  Outcome: tolerated well, no immediate complications  Procedure, treatment alternatives, risks and benefits explained, specific risks discussed. Consent was given by the patient. Immediately prior to procedure a time out was called to verify the correct patient, procedure, equipment, support staff and site/side marked as required. Patient was prepped and draped in the usual sterile fashion.

## 2023-11-20 NOTE — PROGRESS NOTES
"    [facility] [place] [facility] [place]        Subjective     CC: Follow-up (9 mo f/u - Left knee pain)      HPI    Cierra Alvarez is a 77 y.o. female.  Patient presents for evaluation of left knee pain.  Previous appointment was for right knee (August 2023)--patient received corticosteroid injection.    Pain scale: 5-6/10.  Associated symptoms pain, stiffness, giving away.  Pain is worse with walking, stairs, rising from a seated position.  Resting, ice, laying down, elevating help.  Patient's been taking Tylenol and Voltaren.    Patient notes increasing pain to left knee.    ROS:    Constiutional:Pt denies fever, chills, nausea, or vomiting.  MSK:as above        Objective      Past Medical History  Past Medical History:   Diagnosis Date    Depression     Heartburn     Hypertension     Primary osteoarthritis of left knee 9/19/2016     Social History     Socioeconomic History    Marital status: Single   Tobacco Use    Smoking status: Every Day     Packs/day: 0.25     Years: 59.00     Additional pack years: 0.00     Total pack years: 14.75     Types: Cigarettes    Smokeless tobacco: Never   Vaping Use    Vaping Use: Never used   Substance and Sexual Activity    Alcohol use: Yes     Comment: 2-3 times a year    Drug use: Never    Sexual activity: Defer          Physical Exam  /84   Ht 161.3 cm (63.5\")   Wt 67.1 kg (148 lb)   BMI 25.80 kg/m²     Body mass index is 25.8 kg/m².    Patient is well nourished and well developed.        Ortho Exam  Left knee  Alignment: Genu varum  Skin: Intact without any erythema, warmth, swelling or evidence of infection.  Motion: 5-120° with mild crepitus.  Tenderness: Positive medial joint line and chaz-/retropatellar.    Instability: ACL/PCL/MCL/LCL stable and intact on exam.  Meniscus: Remy's positive pain medial joint line without catch or click..  Patella: Compression/grind positive.  Straight leg raise: Intact.  Motor: Grossly intact Q/HS/TA/GS/EHL/P  Sensory: Grossly " intact light touch DP/SP/S/S/T nerve distributions.       Imaging/Labs/EMG Reviewed:  Ordered left knee plain films.  Imaging read/interpreted by Dr. Owens.    Imaging Results (Last 24 Hours)       Procedure Component Value Units Date/Time    XR Knee 4+ View Left [[id number]] Resulted: 11/20/23 1226     Updated: 11/20/23 1227    Narrative:      Left Knee Radiographs  Indication: left knee pain  Views: Standing AP's and skiers of both knees, with lateral and sunrise   views of the left knee    Comparison: 6/2/2022    Findings:    Bone-on-bone contact medial compartment, tricompartmental degeneration, no   acute bony abnormalities, no unusual bony features.  Diffuse osteopenia.    Worsening compared to the previous imaging.              Assessment:  1. Primary osteoarthritis of left knee    2. Left knee pain, unspecified chronicity    3. Tobacco abuse        Plan:  Primary osteoarthritis left knee causing chronic pain, worsening.  Reviewed plain film imaging with the patient--worsening arthritic changes noted.  Still uninterested in surgical intervention at this time.    She is a candidate for TKA based on her imaging but because of her smoking cannot proceed with TKA.  She understands that she needs to be tobacco/nicotine free for minimum of 6 weeks prior to surgery.    Offered and accepted corticosteroid injection to left knee.  Injection was given today.  Recommend OTC pain medication as needed.  Tobacco abuse--discussed the importance of smoking cessation, reviewed the adverse effects of tobacco use: increased risk of tendonitis (more difficult to treat and resolve), hardening of the arteries, gangrene, amputation, etc. Included in the counseling were treatments options for cessation: quitting cold turkey, medication, nicotine step-down programs and smoking cessation programs. Furthermore, I explained to the patient the importance of abstaining from nicotine usage as nicotine is known to increase risk of  complications associated with surgery including but not limited to infection, decreased wound healing, fracture/fusion nonunion, slowed soft tissue healing, and increased surgery associated pain. (5 minutes spent counseling patient)  Follow up as needed.  Questions and concerns answered.    After discussing the risks, benefits, indications of injection, the patient gave consent to proceed.  Her left knee was confirmed as the correct joint to be injected with a timeout.  It was then prepped using Hibiclens and injected with a mixture of 4 cc of 1% plain lidocaine and 1 cc of Kenalog (40 mg per mL), without any resistance through the anterior lateral approach, patient in seated position.  Area was cleaned, hemostasis was achieved and a Band-Aid was applied over the injection site.  The patient tolerated procedure well.  I instructed the patient on signs and symptoms of infection.  They should report to the emergency department or return to clinic if any of these develop, for further evaluation and treatment.  Recommended modifying activity for the next 48 hours to include rest, ice, elevation and oral pain medication as needed.       Yue Burt PA-C  11/20/23  22:41 EST      Dictated Utilizing Dragon Dictation.

## 2023-11-27 RX ORDER — LIDOCAINE HYDROCHLORIDE 10 MG/ML
4 INJECTION, SOLUTION EPIDURAL; INFILTRATION; INTRACAUDAL; PERINEURAL
Status: COMPLETED | OUTPATIENT
Start: 2023-11-20 | End: 2023-11-20

## 2023-11-27 RX ORDER — TRIAMCINOLONE ACETONIDE 40 MG/ML
40 INJECTION, SUSPENSION INTRA-ARTICULAR; INTRAMUSCULAR
Status: COMPLETED | OUTPATIENT
Start: 2023-11-20 | End: 2023-11-20

## 2023-12-07 DIAGNOSIS — F31.75 BIPOLAR DISORDER, IN PARTIAL REMISSION, MOST RECENT EPISODE DEPRESSED: Chronic | ICD-10-CM

## 2023-12-07 RX ORDER — LITHIUM CARBONATE 150 MG/1
CAPSULE ORAL
Qty: 270 CAPSULE | Refills: 0 | OUTPATIENT
Start: 2023-12-07

## 2023-12-07 RX ORDER — AMITRIPTYLINE HYDROCHLORIDE 10 MG/1
20-30 TABLET, FILM COATED ORAL NIGHTLY PRN
Qty: 270 TABLET | Refills: 0 | OUTPATIENT
Start: 2023-12-07

## 2023-12-18 DIAGNOSIS — M25.569 KNEE PAIN, UNSPECIFIED CHRONICITY, UNSPECIFIED LATERALITY: ICD-10-CM

## 2024-03-04 ENCOUNTER — OFFICE VISIT (OUTPATIENT)
Dept: ORTHOPEDIC SURGERY | Facility: CLINIC | Age: 79
End: 2024-03-04
Payer: MEDICARE

## 2024-03-04 VITALS
DIASTOLIC BLOOD PRESSURE: 74 MMHG | HEIGHT: 64 IN | WEIGHT: 147 LBS | BODY MASS INDEX: 25.1 KG/M2 | SYSTOLIC BLOOD PRESSURE: 120 MMHG

## 2024-03-04 DIAGNOSIS — Z72.0 TOBACCO ABUSE: ICD-10-CM

## 2024-03-04 DIAGNOSIS — M25.562 CHRONIC PAIN OF LEFT KNEE: ICD-10-CM

## 2024-03-04 DIAGNOSIS — G89.29 CHRONIC PAIN OF LEFT KNEE: ICD-10-CM

## 2024-03-04 DIAGNOSIS — M17.12 PRIMARY OSTEOARTHRITIS OF LEFT KNEE: Primary | ICD-10-CM

## 2024-03-04 PROCEDURE — 99214 OFFICE O/P EST MOD 30 MIN: CPT | Performed by: PHYSICIAN ASSISTANT

## 2024-03-04 PROCEDURE — 20610 DRAIN/INJ JOINT/BURSA W/O US: CPT | Performed by: PHYSICIAN ASSISTANT

## 2024-03-04 PROCEDURE — 1160F RVW MEDS BY RX/DR IN RCRD: CPT | Performed by: PHYSICIAN ASSISTANT

## 2024-03-04 PROCEDURE — 1159F MED LIST DOCD IN RCRD: CPT | Performed by: PHYSICIAN ASSISTANT

## 2024-03-04 RX ORDER — LIDOCAINE HYDROCHLORIDE 10 MG/ML
4 INJECTION, SOLUTION EPIDURAL; INFILTRATION; INTRACAUDAL; PERINEURAL
Status: COMPLETED | OUTPATIENT
Start: 2024-03-04 | End: 2024-03-04

## 2024-03-04 RX ORDER — TRIAMCINOLONE ACETONIDE 40 MG/ML
1 INJECTION, SUSPENSION INTRA-ARTICULAR; INTRAMUSCULAR
Status: COMPLETED | OUTPATIENT
Start: 2024-03-04 | End: 2024-03-04

## 2024-03-04 RX ADMIN — TRIAMCINOLONE ACETONIDE 1 ML: 40 INJECTION, SUSPENSION INTRA-ARTICULAR; INTRAMUSCULAR at 09:04

## 2024-03-04 RX ADMIN — LIDOCAINE HYDROCHLORIDE 4 ML: 10 INJECTION, SOLUTION EPIDURAL; INFILTRATION; INTRACAUDAL; PERINEURAL at 09:04

## 2024-03-04 NOTE — PROGRESS NOTES
"    [facility] [place] [facility] Note        Subjective     CC: Follow-up (3 month f/u; Primary osteoarthritis of left knee)      FADIA Alvarez is a 78 y.o. female.  Patient returns requesting repeat corticosteroid injection to the left knee.  States about 3 weeks ago started increasing in pain and stiffness.  Prior injection was given on 11/20/2023.    Pain scale: 6/10.  Associated symptoms pain, stiffness.  Pain is worse with walking, standing, laying on the affected side, rising from a seated position.  Resting, ice, medication, lying down, elevating help.    Overall, patient's symptoms are as above.  Patient has continued to smoke.  She reports she got all the way down to 8 cigarettes/day but then when she got her paycheck it blew back up to a pack a day.  She is trying to quit because she reports now it is making her and her cat sick.    ROS:    Constiutional:Pt denies fever, chills, nausea, or vomiting.  MSK:as above        Objective      Past Medical History  Past Medical History:   Diagnosis Date    Depression     Heartburn     Hypertension     Primary osteoarthritis of left knee 9/19/2016     Social History     Socioeconomic History    Marital status: Single   Tobacco Use    Smoking status: Every Day     Current packs/day: 1.00     Average packs/day: 1 pack/day for 63.2 years (63.2 ttl pk-yrs)     Types: Cigarettes     Start date: 1961     Passive exposure: Current    Smokeless tobacco: Never   Vaping Use    Vaping status: Never Used   Substance and Sexual Activity    Alcohol use: Yes     Comment: 2-3 times a year    Drug use: Never    Sexual activity: Defer          Physical Exam  /74   Ht 161.3 cm (63.5\")   Wt 66.7 kg (147 lb)   BMI 25.63 kg/m²     Body mass index is 25.63 kg/m².    Patient is well nourished and well developed.        Ortho Exam  Left knee  Skin: Intact without any erythema, warmth, swelling or evidence of infection.  Motion: 5-120° with mild crepitus.  Tenderness: " Positive medial joint line and chaz-/retropatellar.    Instability: ACL/PCL/MCL/LCL stable and intact on exam.  Straight leg raise: Intact.  Motor/sensory: Grossly intact L2-S1.       Imaging/Labs/EMG Reviewed:  No new imaging today.      Assessment:  1. Primary osteoarthritis of left knee    2. Chronic pain of left knee    3. Tobacco abuse        Plan:  Primary osteoarthritis left knee causing chronic pain.  Tobacco abuse--patient has continued to smoke but reports cutting down to 8 cigarettes a day until she gets paid and then it balloons back up to a pack a day.  She is trying to quit due to the fact that it makes both she and her cat sick.    She understands that she is not a candidate for TKA based on imaging and exam; however, because of her tobacco use unable to proceed until she is nicotine free.      Offered and accepted corticosteroid injection to left knee.  Injection was given today.  Recommend OTC pain medication as needed.  Patient did request refill of Voltaren gel which was provided.  Follow up as needed.  Questions and concerns answered.    After discussing the risks, benefits, indications of injection, the patient gave consent to proceed.  Her left knee was confirmed as the correct joint to be injected with a timeout.  It was then prepped using Hibiclens and injected with a mixture of 4 cc of 1% plain lidocaine and 1 cc of Kenalog (40 mg per mL), without any resistance through the anterior lateral approach, patient in seated position.  Area was cleaned, hemostasis was achieved and a Band-Aid was applied over the injection site.  The patient tolerated procedure well.  I instructed the patient on signs and symptoms of infection.  They should report to the emergency department or return to clinic if any of these develop, for further evaluation and treatment.  Recommended modifying activity for the next 48 hours to include rest, ice, elevation and oral pain medication as needed.       Yue HUMMEL  GIULIA Burt  03/04/24  09:11 EST      Dictated Utilizing Dragon Dictation.

## 2024-03-04 NOTE — PROGRESS NOTES
Procedure   - Large Joint Arthrocentesis: L knee on 3/4/2024 9:04 AM  Indications: pain  Details: 21 G needle, anterolateral approach  Medications: 4 mL lidocaine PF 1% 1 %; 1 mL triamcinolone acetonide 40 MG/ML  Outcome: tolerated well, no immediate complications  Procedure, treatment alternatives, risks and benefits explained, specific risks discussed. Consent was given by the patient. Immediately prior to procedure a time out was called to verify the correct patient, procedure, equipment, support staff and site/side marked as required. Patient was prepped and draped in the usual sterile fashion.

## 2024-03-31 RX ORDER — PANTOPRAZOLE SODIUM 40 MG/1
TABLET, DELAYED RELEASE ORAL
Qty: 90 TABLET | Refills: 1 | OUTPATIENT
Start: 2024-03-31

## 2024-08-20 DIAGNOSIS — M25.562 CHRONIC PAIN OF LEFT KNEE: ICD-10-CM

## 2024-08-20 DIAGNOSIS — G89.29 CHRONIC PAIN OF LEFT KNEE: ICD-10-CM

## 2024-09-01 DIAGNOSIS — F31.75 BIPOLAR DISORDER, IN PARTIAL REMISSION, MOST RECENT EPISODE DEPRESSED: Chronic | ICD-10-CM

## 2024-09-01 RX ORDER — FLUOXETINE 10 MG/1
CAPSULE ORAL
Qty: 90 CAPSULE | Refills: 1 | OUTPATIENT
Start: 2024-09-01

## 2024-10-07 DIAGNOSIS — F31.75 BIPOLAR DISORDER, IN PARTIAL REMISSION, MOST RECENT EPISODE DEPRESSED: Chronic | ICD-10-CM

## 2024-10-07 RX ORDER — FLUOXETINE 10 MG/1
CAPSULE ORAL
Qty: 90 CAPSULE | Refills: 1 | OUTPATIENT
Start: 2024-10-07

## 2024-10-17 ENCOUNTER — TELEPHONE (OUTPATIENT)
Dept: ORTHOPEDIC SURGERY | Facility: CLINIC | Age: 79
End: 2024-10-17
Payer: MEDICARE

## 2024-10-17 DIAGNOSIS — M17.12 PRIMARY OSTEOARTHRITIS OF LEFT KNEE: Primary | ICD-10-CM

## 2024-10-17 NOTE — TELEPHONE ENCOUNTER
"DANIEL     Received call from the patient stating she received cortisone injections in her L knee in March of this year and did not feel as if it helped this particular time. She stated she was able to manage okay walking, but \"felt compromised\".  Patient would like to know if she is candidate for the gel injections, and if she can speak with someone in regards to some questions she has about it. Patient stated she would prefer to speak with Yue if possible. Please advise. Thank you kindly!   "

## 2024-10-17 NOTE — TELEPHONE ENCOUNTER
Do you think that she would be a candidate for gel? I can call her and speak with her if so.     Yola

## 2024-10-28 DIAGNOSIS — M25.562 CHRONIC PAIN OF LEFT KNEE: ICD-10-CM

## 2024-10-28 DIAGNOSIS — G89.29 CHRONIC PAIN OF LEFT KNEE: ICD-10-CM

## 2024-10-28 NOTE — TELEPHONE ENCOUNTER
Incoming Refill Request      Medication requested (name and dose): Diclofenac Sodium (VOLTAREN) 1 % gel gel     Pharmacy where request should be sent: [id number] - [facility] [address] - [phone] PH - [phone] FX      Additional details provided by patient: Per patient request, added the following pharmacy listed above to her chart, and confirmed it to be her primary. Per patient, she is currently out of cream. Sending as HIGH priority, due to patient having less than a three day supply.       Best call back number: [phone]    Does the patient have less than a 3 day supply:  [x] Yes  [] No    Lux Munoz Rep  10/28/24, 10:50 EDT

## 2024-10-31 ENCOUNTER — TELEPHONE (OUTPATIENT)
Dept: ORTHOPEDIC SURGERY | Facility: CLINIC | Age: 79
End: 2024-10-31
Payer: MEDICARE

## 2024-10-31 NOTE — TELEPHONE ENCOUNTER
[facility] IS CALLING IN FOR CLARIFICATION ON THE VOLTAREN GEL. THEY WANT TO KNOW HOW MANY GRAMS THE PATIENT SHOULD APPLY?     IF CLINICAL STAFF COULD PLEASE ADVISE.     CALL BACK # [phone] Yarely TEJEDA

## 2024-10-31 NOTE — TELEPHONE ENCOUNTER
Called and spoke with pharmacist. Let her know it was 2 grams. She verbalized understanding.    Anita Rosado CMA ([facility])

## 2024-11-14 ENCOUNTER — CLINICAL SUPPORT (OUTPATIENT)
Dept: ORTHOPEDIC SURGERY | Facility: CLINIC | Age: 79
End: 2024-11-14
Payer: MEDICARE

## 2024-11-14 VITALS
HEIGHT: 64 IN | SYSTOLIC BLOOD PRESSURE: 132 MMHG | DIASTOLIC BLOOD PRESSURE: 84 MMHG | WEIGHT: 147 LBS | BODY MASS INDEX: 25.1 KG/M2

## 2024-11-14 DIAGNOSIS — M17.12 PRIMARY OSTEOARTHRITIS OF LEFT KNEE: ICD-10-CM

## 2024-11-14 DIAGNOSIS — M25.562 CHRONIC PAIN OF LEFT KNEE: Primary | ICD-10-CM

## 2024-11-14 DIAGNOSIS — G89.29 CHRONIC PAIN OF LEFT KNEE: Primary | ICD-10-CM

## 2024-11-14 DIAGNOSIS — Z72.0 TOBACCO ABUSE: ICD-10-CM

## 2024-11-14 RX ADMIN — Medication 20 MG: at 14:57

## 2024-11-14 NOTE — PROGRESS NOTES
"    [facility] Note        Subjective     CC: Follow-up (8 month recheck- Primary osteoarthritis of left knee)      FADIA Alvarez is a 78 y.o. female.  Patient returns today for follow-up evaluation of her left knee.  She reports that her pain is worsening and the knee feels unstable.  She is already been approved to start viscosupplementation series which she wants the knee reevaluated first.  Last injection was back in March 2024 and it was a steroid injection.    Pain scale: 6/10.  Associated symptoms pain, stiffness, giving away.  Pain is worse with walking, standing, laying on the affected side, rising from a seated position.  Resting, ice, lying down, elevating help.     Overall, patient's symptoms are worse with patient feeling as if the knee is unstable.  Continues to smoke but reports she is still working on cutting down.        ROS:    Constiutional:Pt denies fever, chills, nausea, or vomiting.  MSK:as above        Objective      Past Medical History  Past Medical History:   Diagnosis Date    Depression     Heartburn     Hypertension     Primary osteoarthritis of left knee 9/19/2016     Social History     Socioeconomic History    Marital status: Single   Tobacco Use    Smoking status: Every Day     Current packs/day: 1.00     Average packs/day: 1 pack/day for 63.9 years (63.9 ttl pk-yrs)     Types: Cigarettes     Start date: 1961     Passive exposure: Current    Smokeless tobacco: Never   Vaping Use    Vaping status: Never Used   Substance and Sexual Activity    Alcohol use: Yes     Comment: 2-3 times a year    Drug use: Never    Sexual activity: Defer          Physical Exam  /84   Ht 161.3 cm (63.5\")   Wt 66.7 kg (147 lb)   BMI 25.63 kg/m²     Body mass index is 25.63 kg/m².    Patient is well nourished and well developed.        Ortho Exam  Left knee  Alignment: Genu varum  Skin: Intact without any erythema, warmth, swelling or evidence of infection.  Motion: 5-120° " with mild crepitus.  Tenderness: Positive medial joint line and chaz-/retropatellar.    Instability: Lachman negative.  Mild laxity to varus and valgus stress at terminal extension and 30 degrees without gross instability (pseudolaxity).  Straight leg raise: Intact.  Motor/sensory: Grossly intact L2-S1.       Imaging/Labs/EMG Reviewed and Interpreted:  Ordered left knee plain films.  Imaging read/interpreted by Dr. Hills.    Imaging Results (Last 24 Hours)       Procedure Component Value Units Date/Time    XR Knee 4+ View Left [[id number]] Resulted: 11/14/24 1525     Updated: 11/14/24 1525    Narrative:      Indication: Left knee pain    Comparison: Todays xrays were compared to previous xrays from 11/20/2023      Impression:           Left Knee: moderate to severe tricompartmental arthritis with genu varum   alignment, periarticular osteophytes visualized in all compartments and No   significant changes compared to prior radiographs.  No acute bony injury   or fracture.              Assessment:  1. Chronic pain of left knee    2. Primary osteoarthritis of left knee    3. Tobacco abuse        Plan:  Chronic pain left knee (worsening) due to osteoarthritis--moderate to severe.    Reviewed imaging.  Tobacco abuse--patient has continued to smoke but reports cutting down.  Patient understands she needs to be nicotine free in order to proceed with TKA.  Patient has been approved for viscosupplementation series and will receive her first injection today of Orthovisc.  Injection was given.    Recommend OTC pain medication as needed.  Requesting the patient be treated with a Drytec brace as soon as possible to avoid any further injury or trauma to left knee. Agreeable to knee brace for pseudolaxity.  Follow up in 1 week for 2/3 Orthovisc.  Questions and concerns answered.    After discussing risks of injection the patient gave consent to proceed.  Bon left knee was confirmed as the correct joint to be injected with a  timeout.  The knee was then prepped with Hibiclens and injected with a prefilled syringe of Orthovisc without any resistance using anterior lateral approach, patient in seated position.  The patient tolerated procedure well.  Hemostasis was achieved and a Band-Aid was applied over the injection site.  I instructed the patient on signs and symptoms of infection.  They should report to the ED if any of these develop.  Recommended modifying activity to include rest, ice, elevation and/or heat along with oral pain medication as needed.  Patient observed ambulating normally after the injection.      Yue Burt PA-C  11/15/24  07:44 EST      Dictated Utilizing Dragon Dictation.

## 2024-11-14 NOTE — PROGRESS NOTES
Procedure   - Large Joint Arthrocentesis: L knee on 11/14/2024 2:57 PM  Indications: pain  Details: 21 G needle, anterolateral approach  Medications: 20 mg Sodium Hyaluronate (Viscosup) 20 MG/2ML  Outcome: tolerated well, no immediate complications  Procedure, treatment alternatives, risks and benefits explained, specific risks discussed. Consent was given by the patient. Immediately prior to procedure a time out was called to verify the correct patient, procedure, equipment, support staff and site/side marked as required. Patient was prepped and draped in the usual sterile fashion.

## 2024-11-15 RX ORDER — HYALURONATE SODIUM 10 MG/ML
20 SYRINGE (ML) INTRAARTICULAR
Status: COMPLETED | OUTPATIENT
Start: 2024-11-14 | End: 2024-11-14

## 2024-11-21 ENCOUNTER — CLINICAL SUPPORT (OUTPATIENT)
Dept: ORTHOPEDIC SURGERY | Facility: CLINIC | Age: 79
End: 2024-11-21
Payer: MEDICARE

## 2024-11-21 DIAGNOSIS — G89.29 CHRONIC PAIN OF LEFT KNEE: Primary | ICD-10-CM

## 2024-11-21 DIAGNOSIS — M25.562 CHRONIC PAIN OF LEFT KNEE: Primary | ICD-10-CM

## 2024-11-21 DIAGNOSIS — M17.12 PRIMARY OSTEOARTHRITIS OF LEFT KNEE: ICD-10-CM

## 2024-11-21 RX ORDER — HYALURONATE SODIUM 10 MG/ML
20 SYRINGE (ML) INTRAARTICULAR
Status: COMPLETED | OUTPATIENT
Start: 2024-11-21 | End: 2024-11-21

## 2024-11-21 RX ADMIN — Medication 20 MG: at 14:26

## 2024-11-21 NOTE — PROGRESS NOTES
CC: Follow-up left knee osteoarthritis, 2/3 Euflexxa injection today    History of present illness: Patient presents for her second Euflexxa injection to the left knee today.  At this time the patient denies any numbness or tingling into the distal extremity.  No fever, chills, night sweats or other constitutional symptoms.    See chart for PMH, PSH, Meds, All - reviewed.    Ortho exam:  Left knee  Skin: Intact without redness, warmth or swelling/effusion.    Motor/sensory: Grossly intact L2-S1.    Assessment/plan:  Left knee osteoarthritis    Proceed today with 2/3 of Orthovisc injection.  Patient will follow-up in 1 week for third injection.      After discussing risks of injection the patient gave consent to proceed.  Her left knee was confirmed as the correct joint to be injected with a timeout.  The knee was then prepped with Hibiclens and injected with a prefilled syringe of Euflexxa without any resistance using anterior lateral approach, patient in seated position.  The patient tolerated procedure well.  Hemostasis was achieved and a Band-Aid was applied over the injection site.  I instructed the patient on signs and symptoms of infection.  They should report to the ED if any of these develop.  Recommended modifying activity to include rest, ice, elevation and/or heat along with oral pain medication as needed.

## 2024-12-10 ENCOUNTER — CLINICAL SUPPORT (OUTPATIENT)
Dept: ORTHOPEDIC SURGERY | Facility: CLINIC | Age: 79
End: 2024-12-10
Payer: MEDICARE

## 2024-12-10 DIAGNOSIS — M17.12 PRIMARY OSTEOARTHRITIS OF LEFT KNEE: Primary | ICD-10-CM

## 2024-12-10 PROCEDURE — 20610 DRAIN/INJ JOINT/BURSA W/O US: CPT | Performed by: PHYSICIAN ASSISTANT

## 2024-12-10 RX ORDER — HYALURONATE SODIUM 10 MG/ML
20 SYRINGE (ML) INTRAARTICULAR
Status: COMPLETED | OUTPATIENT
Start: 2024-12-10 | End: 2024-12-10

## 2024-12-10 RX ADMIN — Medication 20 MG: at 14:59

## 2024-12-10 NOTE — PROGRESS NOTES
CC: Follow-up left knee osteoarthritis, 3/3 Euflexxa injection today     History of present illness: Patient presents for her third Euflexxa injection to the left knee today.  At this time the patient denies any numbness or tingling into the distal extremity.  No fever, chills, night sweats or other constitutional symptoms.    Patient has been unable to get here the last 2 weeks for her third and final Euflexxa injection.  She understands that the results following today's injection may not be similar to what she has had in the past but she is still willing to proceed with the third and final injection.     See chart for PMH, PSH, Meds, All - reviewed.     Ortho exam:  Left knee  Skin: Intact without redness, warmth or swelling/effusion.    Motor/sensory: Grossly intact L2-S1.     Assessment/plan:  Left knee osteoarthritis     Proceed today with 3/3 of Euflexxa injection.  Patient would like to follow-up as needed.  She understands that she can repeat this series 6 months +1-day from today's injection (6/11/2025).     After discussing risks of injection the patient gave consent to proceed.  Her left knee was confirmed as the correct joint to be injected with a timeout.  The knee was then prepped with Hibiclens and injected with a prefilled syringe of Euflexxa without any resistance using anterior lateral approach, patient in seated position.  The patient tolerated procedure well.  Hemostasis was achieved and a Band-Aid was applied over the injection site.  I instructed the patient on signs and symptoms of infection.  They should report to the ED if any of these develop.  Recommended modifying activity to include rest, ice, elevation and/or heat along with oral pain medication as needed.

## 2024-12-10 NOTE — PROGRESS NOTES
Procedure   Large Joint Arthrocentesis: L knee  Date/Time: 12/10/2024 2:59 PM  Consent given by: patient  Site marked: site marked  Timeout: Immediately prior to procedure a time out was called to verify the correct patient, procedure, equipment, support staff and site/side marked as required   Supporting Documentation  Indications: pain   Procedure Details  Location: knee - L knee  Preparation: Patient was prepped and draped in the usual sterile fashion  Needle gauge: 21.  Approach: anterolateral  Medications administered: 20 mg Sodium Hyaluronate (Viscosup) 20 MG/2ML  Patient tolerance: patient tolerated the procedure well with no immediate complications